# Patient Record
Sex: FEMALE | Race: BLACK OR AFRICAN AMERICAN | NOT HISPANIC OR LATINO | Employment: OTHER | URBAN - METROPOLITAN AREA
[De-identification: names, ages, dates, MRNs, and addresses within clinical notes are randomized per-mention and may not be internally consistent; named-entity substitution may affect disease eponyms.]

---

## 2024-05-21 ENCOUNTER — APPOINTMENT (OUTPATIENT)
Dept: RADIOLOGY | Facility: HOSPITAL | Age: 84
DRG: 312 | End: 2024-05-21
Payer: COMMERCIAL

## 2024-05-21 ENCOUNTER — CLINICAL SUPPORT (OUTPATIENT)
Dept: EMERGENCY MEDICINE | Facility: HOSPITAL | Age: 84
DRG: 312 | End: 2024-05-21
Payer: COMMERCIAL

## 2024-05-21 ENCOUNTER — HOSPITAL ENCOUNTER (INPATIENT)
Facility: HOSPITAL | Age: 84
LOS: 7 days | Discharge: HOME | DRG: 312 | End: 2024-05-28
Attending: EMERGENCY MEDICINE | Admitting: INTERNAL MEDICINE
Payer: COMMERCIAL

## 2024-05-21 DIAGNOSIS — I10 PRIMARY HYPERTENSION: ICD-10-CM
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
## 2024-05-21 DIAGNOSIS — K59.01 SLOW TRANSIT CONSTIPATION: ICD-10-CM

## 2024-05-21 DIAGNOSIS — R55 SYNCOPE, UNSPECIFIED SYNCOPE TYPE: ICD-10-CM

## 2024-05-21 LAB
ALBUMIN SERPL BCP-MCNC: 3.1 G/DL (ref 3.4–5)
ALBUMIN SERPL BCP-MCNC: 4.2 G/DL (ref 3.4–5)
ALP SERPL-CCNC: 54 U/L (ref 33–136)
ALT SERPL W P-5'-P-CCNC: <3 U/L (ref 7–45)
ANION GAP SERPL CALC-SCNC: 14 MMOL/L (ref 10–20)
ANION GAP SERPL CALC-SCNC: 19 MMOL/L (ref 10–20)
APPEARANCE UR: CLEAR
AST SERPL W P-5'-P-CCNC: 13 U/L (ref 9–39)
BACTERIA #/AREA URNS AUTO: ABNORMAL /HPF
BASOPHILS # BLD AUTO: 0.03 X10*3/UL (ref 0–0.1)
BASOPHILS # BLD AUTO: 0.04 X10*3/UL (ref 0–0.1)
BASOPHILS NFR BLD AUTO: 0.3 %
BASOPHILS NFR BLD AUTO: 0.5 %
BILIRUB SERPL-MCNC: 0.5 MG/DL (ref 0–1.2)
BILIRUB UR STRIP.AUTO-MCNC: NEGATIVE MG/DL
BUN SERPL-MCNC: 46 MG/DL (ref 6–23)
BUN SERPL-MCNC: 52 MG/DL (ref 6–23)
CALCIUM SERPL-MCNC: 7.6 MG/DL (ref 8.6–10.6)
CALCIUM SERPL-MCNC: 9.4 MG/DL (ref 8.6–10.6)
CARDIAC TROPONIN I PNL SERPL HS: 16 NG/L (ref 0–34)
CARDIAC TROPONIN I PNL SERPL HS: 16 NG/L (ref 0–34)
CHLORIDE SERPL-SCNC: 103 MMOL/L (ref 98–107)
CHLORIDE SERPL-SCNC: 98 MMOL/L (ref 98–107)
CHLORIDE UR-SCNC: 61 MMOL/L
CHLORIDE/CREATININE (MMOL/G) IN URINE: 79 MMOL/G CREAT (ref 38–318)
CO2 SERPL-SCNC: 25 MMOL/L (ref 21–32)
CO2 SERPL-SCNC: 27 MMOL/L (ref 21–32)
COLOR UR: ABNORMAL
CREAT SERPL-MCNC: 1.89 MG/DL (ref 0.5–1.05)
CREAT SERPL-MCNC: 2.32 MG/DL (ref 0.5–1.05)
CREAT UR-MCNC: 76.8 MG/DL (ref 20–320)
EGFRCR SERPLBLD CKD-EPI 2021: 20 ML/MIN/1.73M*2
EGFRCR SERPLBLD CKD-EPI 2021: 26 ML/MIN/1.73M*2
EOSINOPHIL # BLD AUTO: 1.12 X10*3/UL (ref 0–0.4)
EOSINOPHIL # BLD AUTO: 1.35 X10*3/UL (ref 0–0.4)
EOSINOPHIL NFR BLD AUTO: 11.5 %
EOSINOPHIL NFR BLD AUTO: 15.6 %
ERYTHROCYTE [DISTWIDTH] IN BLOOD BY AUTOMATED COUNT: 14.7 % (ref 11.5–14.5)
ERYTHROCYTE [DISTWIDTH] IN BLOOD BY AUTOMATED COUNT: 15 % (ref 11.5–14.5)
GLUCOSE BLD MANUAL STRIP-MCNC: 164 MG/DL (ref 74–99)
GLUCOSE BLD MANUAL STRIP-MCNC: 167 MG/DL (ref 74–99)
GLUCOSE BLD MANUAL STRIP-MCNC: 192 MG/DL (ref 74–99)
GLUCOSE BLD MANUAL STRIP-MCNC: 239 MG/DL (ref 74–99)
GLUCOSE SERPL-MCNC: 169 MG/DL (ref 74–99)
GLUCOSE SERPL-MCNC: 188 MG/DL (ref 74–99)
GLUCOSE UR STRIP.AUTO-MCNC: ABNORMAL MG/DL
HCT VFR BLD AUTO: 32.5 % (ref 36–46)
HCT VFR BLD AUTO: 39.2 % (ref 36–46)
HGB BLD-MCNC: 11.2 G/DL (ref 12–16)
HGB BLD-MCNC: 13.4 G/DL (ref 12–16)
HOLD SPECIMEN: NORMAL
IMM GRANULOCYTES # BLD AUTO: 0.03 X10*3/UL (ref 0–0.5)
IMM GRANULOCYTES # BLD AUTO: 0.04 X10*3/UL (ref 0–0.5)
IMM GRANULOCYTES NFR BLD AUTO: 0.3 % (ref 0–0.9)
IMM GRANULOCYTES NFR BLD AUTO: 0.5 % (ref 0–0.9)
KETONES UR STRIP.AUTO-MCNC: NEGATIVE MG/DL
LEUKOCYTE ESTERASE UR QL STRIP.AUTO: ABNORMAL
LYMPHOCYTES # BLD AUTO: 2.01 X10*3/UL (ref 0.8–3)
LYMPHOCYTES # BLD AUTO: 2.41 X10*3/UL (ref 0.8–3)
LYMPHOCYTES NFR BLD AUTO: 20.7 %
LYMPHOCYTES NFR BLD AUTO: 27.9 %
MAGNESIUM SERPL-MCNC: 1.72 MG/DL (ref 1.6–2.4)
MAGNESIUM SERPL-MCNC: 2.17 MG/DL (ref 1.6–2.4)
MCH RBC QN AUTO: 31.3 PG (ref 26–34)
MCH RBC QN AUTO: 31.6 PG (ref 26–34)
MCHC RBC AUTO-ENTMCNC: 34.2 G/DL (ref 32–36)
MCHC RBC AUTO-ENTMCNC: 34.5 G/DL (ref 32–36)
MCV RBC AUTO: 92 FL (ref 80–100)
MCV RBC AUTO: 92 FL (ref 80–100)
MONOCYTES # BLD AUTO: 0.48 X10*3/UL (ref 0.05–0.8)
MONOCYTES # BLD AUTO: 0.58 X10*3/UL (ref 0.05–0.8)
MONOCYTES NFR BLD AUTO: 5.5 %
MONOCYTES NFR BLD AUTO: 6 %
NEUTROPHILS # BLD AUTO: 4.33 X10*3/UL (ref 1.6–5.5)
NEUTROPHILS # BLD AUTO: 5.94 X10*3/UL (ref 1.6–5.5)
NEUTROPHILS NFR BLD AUTO: 50 %
NEUTROPHILS NFR BLD AUTO: 61.2 %
NITRITE UR QL STRIP.AUTO: NEGATIVE
NRBC BLD-RTO: 0 /100 WBCS (ref 0–0)
NRBC BLD-RTO: 0 /100 WBCS (ref 0–0)
PH UR STRIP.AUTO: 6.5 [PH]
PHOSPHATE SERPL-MCNC: 3.7 MG/DL (ref 2.5–4.9)
PLATELET # BLD AUTO: 218 X10*3/UL (ref 150–450)
PLATELET # BLD AUTO: 260 X10*3/UL (ref 150–450)
POTASSIUM SERPL-SCNC: 3.4 MMOL/L (ref 3.5–5.3)
POTASSIUM SERPL-SCNC: 4.3 MMOL/L (ref 3.5–5.3)
POTASSIUM UR-SCNC: 33 MMOL/L
POTASSIUM/CREAT UR-RTO: 43 MMOL/G CREAT
PROT SERPL-MCNC: 7.7 G/DL (ref 6.4–8.2)
PROT UR STRIP.AUTO-MCNC: NEGATIVE MG/DL
RBC # BLD AUTO: 3.54 X10*6/UL (ref 4–5.2)
RBC # BLD AUTO: 4.28 X10*6/UL (ref 4–5.2)
RBC # UR STRIP.AUTO: NEGATIVE /UL
RBC #/AREA URNS AUTO: ABNORMAL /HPF
SARS-COV-2 RNA RESP QL NAA+PROBE: NOT DETECTED
SODIUM SERPL-SCNC: 139 MMOL/L (ref 136–145)
SODIUM SERPL-SCNC: 140 MMOL/L (ref 136–145)
SODIUM UR-SCNC: 69 MMOL/L
SODIUM/CREAT UR-RTO: 90 MMOL/G CREAT
SP GR UR STRIP.AUTO: 1.01
SQUAMOUS #/AREA URNS AUTO: ABNORMAL /HPF
T4 FREE SERPL-MCNC: 1.77 NG/DL (ref 0.78–1.48)
TSH SERPL-ACNC: 11.06 MIU/L (ref 0.44–3.98)
UROBILINOGEN UR STRIP.AUTO-MCNC: NORMAL MG/DL
WBC # BLD AUTO: 8.7 X10*3/UL (ref 4.4–11.3)
WBC # BLD AUTO: 9.7 X10*3/UL (ref 4.4–11.3)
WBC #/AREA URNS AUTO: ABNORMAL /HPF

## 2024-05-21 PROCEDURE — 70450 CT HEAD/BRAIN W/O DYE: CPT | Performed by: RADIOLOGY

## 2024-05-21 PROCEDURE — 72125 CT NECK SPINE W/O DYE: CPT | Performed by: RADIOLOGY

## 2024-05-21 PROCEDURE — 81003 URINALYSIS AUTO W/O SCOPE: CPT | Performed by: STUDENT IN AN ORGANIZED HEALTH CARE EDUCATION/TRAINING PROGRAM

## 2024-05-21 PROCEDURE — 83735 ASSAY OF MAGNESIUM: CPT | Mod: 91

## 2024-05-21 PROCEDURE — 73030 X-RAY EXAM OF SHOULDER: CPT | Mod: RT

## 2024-05-21 PROCEDURE — 84484 ASSAY OF TROPONIN QUANT: CPT | Performed by: STUDENT IN AN ORGANIZED HEALTH CARE EDUCATION/TRAINING PROGRAM

## 2024-05-21 PROCEDURE — 2500000002 HC RX 250 W HCPCS SELF ADMINISTERED DRUGS (ALT 637 FOR MEDICARE OP, ALT 636 FOR OP/ED)

## 2024-05-21 PROCEDURE — 2500000004 HC RX 250 GENERAL PHARMACY W/ HCPCS (ALT 636 FOR OP/ED): Performed by: STUDENT IN AN ORGANIZED HEALTH CARE EDUCATION/TRAINING PROGRAM

## 2024-05-21 PROCEDURE — 84439 ASSAY OF FREE THYROXINE: CPT

## 2024-05-21 PROCEDURE — 70450 CT HEAD/BRAIN W/O DYE: CPT

## 2024-05-21 PROCEDURE — 36415 COLL VENOUS BLD VENIPUNCTURE: CPT | Performed by: STUDENT IN AN ORGANIZED HEALTH CARE EDUCATION/TRAINING PROGRAM

## 2024-05-21 PROCEDURE — 73060 X-RAY EXAM OF HUMERUS: CPT | Mod: RT

## 2024-05-21 PROCEDURE — 87635 SARS-COV-2 COVID-19 AMP PRB: CPT | Performed by: STUDENT IN AN ORGANIZED HEALTH CARE EDUCATION/TRAINING PROGRAM

## 2024-05-21 PROCEDURE — 82947 ASSAY GLUCOSE BLOOD QUANT: CPT | Mod: 91

## 2024-05-21 PROCEDURE — 84133 ASSAY OF URINE POTASSIUM: CPT | Mod: 91

## 2024-05-21 PROCEDURE — 93010 ELECTROCARDIOGRAM REPORT: CPT | Performed by: EMERGENCY MEDICINE

## 2024-05-21 PROCEDURE — 2500000001 HC RX 250 WO HCPCS SELF ADMINISTERED DRUGS (ALT 637 FOR MEDICARE OP): Performed by: INTERNAL MEDICINE

## 2024-05-21 PROCEDURE — 84443 ASSAY THYROID STIM HORMONE: CPT

## 2024-05-21 PROCEDURE — 1200000002 HC GENERAL ROOM WITH TELEMETRY DAILY

## 2024-05-21 PROCEDURE — 99285 EMERGENCY DEPT VISIT HI MDM: CPT | Performed by: EMERGENCY MEDICINE

## 2024-05-21 PROCEDURE — 2500000001 HC RX 250 WO HCPCS SELF ADMINISTERED DRUGS (ALT 637 FOR MEDICARE OP)

## 2024-05-21 PROCEDURE — 80069 RENAL FUNCTION PANEL: CPT | Mod: CCI

## 2024-05-21 PROCEDURE — 2500000004 HC RX 250 GENERAL PHARMACY W/ HCPCS (ALT 636 FOR OP/ED)

## 2024-05-21 PROCEDURE — 36415 COLL VENOUS BLD VENIPUNCTURE: CPT

## 2024-05-21 PROCEDURE — 82947 ASSAY GLUCOSE BLOOD QUANT: CPT

## 2024-05-21 PROCEDURE — 99285 EMERGENCY DEPT VISIT HI MDM: CPT | Mod: 25

## 2024-05-21 PROCEDURE — 85025 COMPLETE CBC W/AUTO DIFF WBC: CPT | Performed by: STUDENT IN AN ORGANIZED HEALTH CARE EDUCATION/TRAINING PROGRAM

## 2024-05-21 PROCEDURE — 85025 COMPLETE CBC W/AUTO DIFF WBC: CPT | Mod: 91

## 2024-05-21 PROCEDURE — 93005 ELECTROCARDIOGRAM TRACING: CPT

## 2024-05-21 PROCEDURE — 72125 CT NECK SPINE W/O DYE: CPT

## 2024-05-21 PROCEDURE — 73060 X-RAY EXAM OF HUMERUS: CPT | Mod: RIGHT SIDE | Performed by: RADIOLOGY

## 2024-05-21 PROCEDURE — 96360 HYDRATION IV INFUSION INIT: CPT

## 2024-05-21 PROCEDURE — 73590 X-RAY EXAM OF LOWER LEG: CPT | Mod: 50

## 2024-05-21 PROCEDURE — 83735 ASSAY OF MAGNESIUM: CPT | Performed by: STUDENT IN AN ORGANIZED HEALTH CARE EDUCATION/TRAINING PROGRAM

## 2024-05-21 PROCEDURE — 87086 URINE CULTURE/COLONY COUNT: CPT | Mod: 91 | Performed by: STUDENT IN AN ORGANIZED HEALTH CARE EDUCATION/TRAINING PROGRAM

## 2024-05-21 PROCEDURE — 73590 X-RAY EXAM OF LOWER LEG: CPT | Mod: BILATERAL PROCEDURE | Performed by: RADIOLOGY

## 2024-05-21 PROCEDURE — 82436 ASSAY OF URINE CHLORIDE: CPT

## 2024-05-21 PROCEDURE — 80053 COMPREHEN METABOLIC PANEL: CPT | Performed by: STUDENT IN AN ORGANIZED HEALTH CARE EDUCATION/TRAINING PROGRAM

## 2024-05-21 PROCEDURE — 73030 X-RAY EXAM OF SHOULDER: CPT | Mod: RIGHT SIDE | Performed by: RADIOLOGY

## 2024-05-21 RX ORDER — SPIRONOLACTONE 25 MG/1
12.5 TABLET ORAL DAILY
Status: DISCONTINUED | OUTPATIENT
Start: 2024-05-21 | End: 2024-05-28 | Stop reason: HOSPADM

## 2024-05-21 RX ORDER — CLOPIDOGREL BISULFATE 75 MG/1
75 TABLET ORAL DAILY
Status: DISCONTINUED | OUTPATIENT
Start: 2024-05-21 | End: 2024-05-28 | Stop reason: HOSPADM

## 2024-05-21 RX ORDER — HEPARIN SODIUM 5000 [USP'U]/ML
5000 INJECTION, SOLUTION INTRAVENOUS; SUBCUTANEOUS EVERY 8 HOURS
Status: DISCONTINUED | OUTPATIENT
Start: 2024-05-21 | End: 2024-05-28 | Stop reason: HOSPADM

## 2024-05-21 RX ORDER — CARVEDILOL 12.5 MG/1
25 TABLET ORAL
Status: DISCONTINUED | OUTPATIENT
Start: 2024-05-21 | End: 2024-05-25

## 2024-05-21 RX ORDER — ALLOPURINOL 100 MG/1
50 TABLET ORAL
Status: DISCONTINUED | OUTPATIENT
Start: 2024-05-21 | End: 2024-05-28 | Stop reason: HOSPADM

## 2024-05-21 RX ORDER — ALLOPURINOL 100 MG/1
50 TABLET ORAL EVERY OTHER DAY
COMMUNITY

## 2024-05-21 RX ORDER — ALLOPURINOL 100 MG/1
50 TABLET ORAL DAILY
Status: DISCONTINUED | OUTPATIENT
Start: 2024-05-21 | End: 2024-05-21

## 2024-05-21 RX ORDER — LEVOTHYROXINE SODIUM 50 UG/1
150 TABLET ORAL DAILY
Status: DISCONTINUED | OUTPATIENT
Start: 2024-05-21 | End: 2024-05-28 | Stop reason: HOSPADM

## 2024-05-21 RX ORDER — INSULIN LISPRO 100 [IU]/ML
0-5 INJECTION, SOLUTION INTRAVENOUS; SUBCUTANEOUS
Status: DISCONTINUED | OUTPATIENT
Start: 2024-05-21 | End: 2024-05-28 | Stop reason: HOSPADM

## 2024-05-21 RX ORDER — LEVOTHYROXINE SODIUM 150 UG/1
150 TABLET ORAL DAILY
COMMUNITY

## 2024-05-21 RX ORDER — DEXTROSE 50 % IN WATER (D50W) INTRAVENOUS SYRINGE
12.5
Status: DISCONTINUED | OUTPATIENT
Start: 2024-05-21 | End: 2024-05-28 | Stop reason: HOSPADM

## 2024-05-21 RX ORDER — ACETAMINOPHEN 500 MG
500 TABLET ORAL EVERY 6 HOURS PRN
COMMUNITY

## 2024-05-21 RX ORDER — ALLOPURINOL 100 MG/1
100 TABLET ORAL DAILY
Status: DISCONTINUED | OUTPATIENT
Start: 2024-05-21 | End: 2024-05-21

## 2024-05-21 RX ORDER — HYDRALAZINE HYDROCHLORIDE 50 MG/1
50 TABLET, FILM COATED ORAL 2 TIMES DAILY
Status: DISCONTINUED | OUTPATIENT
Start: 2024-05-21 | End: 2024-05-28 | Stop reason: HOSPADM

## 2024-05-21 RX ORDER — FUROSEMIDE 40 MG/1
40 TABLET ORAL DAILY
Status: DISCONTINUED | OUTPATIENT
Start: 2024-05-21 | End: 2024-05-28 | Stop reason: HOSPADM

## 2024-05-21 RX ORDER — FUROSEMIDE 40 MG/1
40 TABLET ORAL DAILY
Status: ON HOLD | COMMUNITY
End: 2024-05-28

## 2024-05-21 RX ORDER — HYDRALAZINE HYDROCHLORIDE 50 MG/1
50 TABLET, FILM COATED ORAL 2 TIMES DAILY
COMMUNITY
End: 2024-05-28 | Stop reason: HOSPADM

## 2024-05-21 RX ORDER — ONDANSETRON HYDROCHLORIDE 2 MG/ML
4 INJECTION, SOLUTION INTRAVENOUS ONCE
Status: DISCONTINUED | OUTPATIENT
Start: 2024-05-21 | End: 2024-05-22

## 2024-05-21 RX ORDER — OXYCODONE HYDROCHLORIDE 5 MG/1
5 TABLET ORAL EVERY 6 HOURS PRN
Status: DISCONTINUED | OUTPATIENT
Start: 2024-05-21 | End: 2024-05-22

## 2024-05-21 RX ORDER — SACUBITRIL AND VALSARTAN 49; 51 MG/1; MG/1
1 TABLET, FILM COATED ORAL 2 TIMES DAILY
COMMUNITY
End: 2024-05-28 | Stop reason: HOSPADM

## 2024-05-21 RX ORDER — ACETAMINOPHEN 325 MG/1
975 TABLET ORAL EVERY 8 HOURS
Status: DISCONTINUED | OUTPATIENT
Start: 2024-05-21 | End: 2024-05-28 | Stop reason: HOSPADM

## 2024-05-21 RX ORDER — DEXTROSE 50 % IN WATER (D50W) INTRAVENOUS SYRINGE
25
Status: DISCONTINUED | OUTPATIENT
Start: 2024-05-21 | End: 2024-05-28 | Stop reason: HOSPADM

## 2024-05-21 RX ORDER — CARVEDILOL 25 MG/1
25 TABLET ORAL
COMMUNITY
End: 2024-05-28 | Stop reason: HOSPADM

## 2024-05-21 RX ORDER — POLYETHYLENE GLYCOL 3350 17 G/17G
17 POWDER, FOR SOLUTION ORAL DAILY
Status: DISCONTINUED | OUTPATIENT
Start: 2024-05-21 | End: 2024-05-28 | Stop reason: HOSPADM

## 2024-05-21 RX ORDER — ATORVASTATIN CALCIUM 20 MG/1
20 TABLET, FILM COATED ORAL DAILY
COMMUNITY

## 2024-05-21 RX ORDER — SPIRONOLACTONE 25 MG/1
12.5 TABLET ORAL DAILY
COMMUNITY
End: 2024-05-28 | Stop reason: HOSPADM

## 2024-05-21 RX ORDER — ACETAMINOPHEN 325 MG/1
975 TABLET ORAL ONCE
Status: COMPLETED | OUTPATIENT
Start: 2024-05-21 | End: 2024-05-21

## 2024-05-21 RX ORDER — MORPHINE SULFATE 4 MG/ML
2 INJECTION INTRAVENOUS ONCE
Status: COMPLETED | OUTPATIENT
Start: 2024-05-21 | End: 2024-05-21

## 2024-05-21 RX ORDER — ATORVASTATIN CALCIUM 20 MG/1
20 TABLET, FILM COATED ORAL DAILY
Status: DISCONTINUED | OUTPATIENT
Start: 2024-05-21 | End: 2024-05-28 | Stop reason: HOSPADM

## 2024-05-21 RX ORDER — CLOPIDOGREL BISULFATE 75 MG/1
75 TABLET ORAL DAILY
COMMUNITY

## 2024-05-21 RX ADMIN — CARVEDILOL 25 MG: 12.5 TABLET, FILM COATED ORAL at 17:46

## 2024-05-21 RX ADMIN — SACUBITRIL AND VALSARTAN 1 TABLET: 49; 51 TABLET, FILM COATED ORAL at 21:53

## 2024-05-21 RX ADMIN — SODIUM CHLORIDE, POTASSIUM CHLORIDE, SODIUM LACTATE AND CALCIUM CHLORIDE 1000 ML: 600; 310; 30; 20 INJECTION, SOLUTION INTRAVENOUS at 02:51

## 2024-05-21 RX ADMIN — ATORVASTATIN CALCIUM 20 MG: 20 TABLET, FILM COATED ORAL at 08:43

## 2024-05-21 RX ADMIN — HYDRALAZINE HYDROCHLORIDE 50 MG: 25 TABLET ORAL at 21:53

## 2024-05-21 RX ADMIN — INSULIN LISPRO 1 UNITS: 100 INJECTION, SOLUTION INTRAVENOUS; SUBCUTANEOUS at 17:45

## 2024-05-21 RX ADMIN — CARVEDILOL 25 MG: 12.5 TABLET, FILM COATED ORAL at 07:44

## 2024-05-21 RX ADMIN — HEPARIN SODIUM 5000 UNITS: 5000 INJECTION INTRAVENOUS; SUBCUTANEOUS at 21:52

## 2024-05-21 RX ADMIN — ACETAMINOPHEN 975 MG: 325 TABLET ORAL at 17:45

## 2024-05-21 RX ADMIN — INSULIN LISPRO 1 UNITS: 100 INJECTION, SOLUTION INTRAVENOUS; SUBCUTANEOUS at 11:07

## 2024-05-21 RX ADMIN — ACETAMINOPHEN 975 MG: 325 TABLET ORAL at 01:55

## 2024-05-21 RX ADMIN — SACUBITRIL AND VALSARTAN 1 TABLET: 49; 51 TABLET, FILM COATED ORAL at 08:42

## 2024-05-21 RX ADMIN — LEVOTHYROXINE SODIUM 150 MCG: 0.07 TABLET ORAL at 07:44

## 2024-05-21 RX ADMIN — CLOPIDOGREL BISULFATE 75 MG: 75 TABLET ORAL at 08:42

## 2024-05-21 RX ADMIN — HYDRALAZINE HYDROCHLORIDE 50 MG: 25 TABLET ORAL at 08:42

## 2024-05-21 RX ADMIN — MORPHINE SULFATE 2 MG: 4 INJECTION INTRAVENOUS at 03:51

## 2024-05-21 SDOH — SOCIAL STABILITY: SOCIAL INSECURITY: ABUSE: ADULT

## 2024-05-21 SDOH — SOCIAL STABILITY: SOCIAL INSECURITY: HAVE YOU HAD ANY THOUGHTS OF HARMING ANYONE ELSE?: NO

## 2024-05-21 SDOH — SOCIAL STABILITY: SOCIAL INSECURITY: DOES ANYONE TRY TO KEEP YOU FROM HAVING/CONTACTING OTHER FRIENDS OR DOING THINGS OUTSIDE YOUR HOME?: NO

## 2024-05-21 SDOH — SOCIAL STABILITY: SOCIAL INSECURITY: HAVE YOU HAD THOUGHTS OF HARMING ANYONE ELSE?: NO

## 2024-05-21 SDOH — SOCIAL STABILITY: SOCIAL INSECURITY: DO YOU FEEL UNSAFE GOING BACK TO THE PLACE WHERE YOU ARE LIVING?: NO

## 2024-05-21 SDOH — SOCIAL STABILITY: SOCIAL INSECURITY: HAS ANYONE EVER THREATENED TO HURT YOUR FAMILY OR YOUR PETS?: NO

## 2024-05-21 SDOH — SOCIAL STABILITY: SOCIAL INSECURITY: WERE YOU ABLE TO COMPLETE ALL THE BEHAVIORAL HEALTH SCREENINGS?: YES

## 2024-05-21 SDOH — SOCIAL STABILITY: SOCIAL INSECURITY: ARE THERE ANY APPARENT SIGNS OF INJURIES/BEHAVIORS THAT COULD BE RELATED TO ABUSE/NEGLECT?: NO

## 2024-05-21 SDOH — SOCIAL STABILITY: SOCIAL INSECURITY: ARE YOU OR HAVE YOU BEEN THREATENED OR ABUSED PHYSICALLY, EMOTIONALLY, OR SEXUALLY BY ANYONE?: NO

## 2024-05-21 SDOH — SOCIAL STABILITY: SOCIAL INSECURITY: DO YOU FEEL ANYONE HAS EXPLOITED OR TAKEN ADVANTAGE OF YOU FINANCIALLY OR OF YOUR PERSONAL PROPERTY?: NO

## 2024-05-21 ASSESSMENT — ACTIVITIES OF DAILY LIVING (ADL)
BATHING: NEEDS ASSISTANCE
HEARING - RIGHT EAR: FUNCTIONAL
ASSISTIVE_DEVICE: CANE;WHEELCHAIR;WALKER
FEEDING YOURSELF: INDEPENDENT
HEARING - LEFT EAR: FUNCTIONAL
GROOMING: NEEDS ASSISTANCE
PATIENT'S MEMORY ADEQUATE TO SAFELY COMPLETE DAILY ACTIVITIES?: YES
ADEQUATE_TO_COMPLETE_ADL: YES
DRESSING YOURSELF: NEEDS ASSISTANCE
TOILETING: NEEDS ASSISTANCE
WALKS IN HOME: NEEDS ASSISTANCE
JUDGMENT_ADEQUATE_SAFELY_COMPLETE_DAILY_ACTIVITIES: YES

## 2024-05-21 ASSESSMENT — LIFESTYLE VARIABLES
HOW OFTEN DO YOU HAVE 6 OR MORE DRINKS ON ONE OCCASION: NEVER
HOW MANY STANDARD DRINKS CONTAINING ALCOHOL DO YOU HAVE ON A TYPICAL DAY: PATIENT DOES NOT DRINK
PRESCIPTION_ABUSE_PAST_12_MONTHS: NO
AUDIT-C TOTAL SCORE: 0
HAVE YOU EVER FELT YOU SHOULD CUT DOWN ON YOUR DRINKING: NO
EVER FELT BAD OR GUILTY ABOUT YOUR DRINKING: NO
EVER HAD A DRINK FIRST THING IN THE MORNING TO STEADY YOUR NERVES TO GET RID OF A HANGOVER: NO
HAVE PEOPLE ANNOYED YOU BY CRITICIZING YOUR DRINKING: NO
TOTAL SCORE: 0
SUBSTANCE_ABUSE_PAST_12_MONTHS: NO
AUDIT-C TOTAL SCORE: 0
SKIP TO QUESTIONS 9-10: 1
HOW OFTEN DO YOU HAVE A DRINK CONTAINING ALCOHOL: NEVER

## 2024-05-21 ASSESSMENT — COGNITIVE AND FUNCTIONAL STATUS - GENERAL
HELP NEEDED FOR BATHING: A LOT
TOILETING: A LITTLE
PERSONAL GROOMING: A LITTLE
STANDING UP FROM CHAIR USING ARMS: A LOT
MOBILITY SCORE: 13
TURNING FROM BACK TO SIDE WHILE IN FLAT BAD: A LITTLE
CLIMB 3 TO 5 STEPS WITH RAILING: TOTAL
WALKING IN HOSPITAL ROOM: A LOT
DRESSING REGULAR UPPER BODY CLOTHING: A LOT
DAILY ACTIVITIY SCORE: 16
MOVING TO AND FROM BED TO CHAIR: A LOT
DRESSING REGULAR LOWER BODY CLOTHING: A LOT
MOVING FROM LYING ON BACK TO SITTING ON SIDE OF FLAT BED WITH BEDRAILS: A LITTLE

## 2024-05-21 ASSESSMENT — ENCOUNTER SYMPTOMS
SORE THROAT: 0
DIARRHEA: 0
DIZZINESS: 1
COUGH: 0
FREQUENCY: 0
PALPITATIONS: 0
VOMITING: 0
CHILLS: 0
SHORTNESS OF BREATH: 1
FEVER: 0
DYSURIA: 0
ABDOMINAL PAIN: 0
RHINORRHEA: 0
DIFFICULTY URINATING: 0
NAUSEA: 1
FATIGUE: 1
CONSTIPATION: 1
LIGHT-HEADEDNESS: 1

## 2024-05-21 ASSESSMENT — COLUMBIA-SUICIDE SEVERITY RATING SCALE - C-SSRS
1. IN THE PAST MONTH, HAVE YOU WISHED YOU WERE DEAD OR WISHED YOU COULD GO TO SLEEP AND NOT WAKE UP?: NO
6. HAVE YOU EVER DONE ANYTHING, STARTED TO DO ANYTHING, OR PREPARED TO DO ANYTHING TO END YOUR LIFE?: NO
2. HAVE YOU ACTUALLY HAD ANY THOUGHTS OF KILLING YOURSELF?: NO

## 2024-05-21 ASSESSMENT — PAIN DESCRIPTION - LOCATION
LOCATION: HEAD

## 2024-05-21 ASSESSMENT — PAIN - FUNCTIONAL ASSESSMENT
PAIN_FUNCTIONAL_ASSESSMENT: 0-10

## 2024-05-21 ASSESSMENT — PATIENT HEALTH QUESTIONNAIRE - PHQ9
1. LITTLE INTEREST OR PLEASURE IN DOING THINGS: NOT AT ALL
SUM OF ALL RESPONSES TO PHQ9 QUESTIONS 1 & 2: 0
2. FEELING DOWN, DEPRESSED OR HOPELESS: NOT AT ALL

## 2024-05-21 ASSESSMENT — PAIN SCALES - GENERAL
PAINLEVEL_OUTOF10: 4
PAINLEVEL_OUTOF10: 10 - WORST POSSIBLE PAIN
PAINLEVEL_OUTOF10: 0 - NO PAIN
PAINLEVEL_OUTOF10: 6
PAINLEVEL_OUTOF10: 5 - MODERATE PAIN

## 2024-05-21 NOTE — H&P
History Of Present Illness  Kristy Sanchez is a 83 y.o. female with PMH including HFrEF (45%), JENELLE (on CPAP), CKD 4, HTN, HLD, T2DM, Hypothyroidism, gout, Hx CVA (2017 on clopidogrel), Hx breast cancer s/p surgery and chemo (2004, reportedly in remission) who presented to [facility] ED on 5/21 for syncope and fall. She is currently being admitted to medicine for further management.    Of note, she was recently seen at [facility] ED 5/16 for shortness of breath, she was in CDU and underwent echocardiogram which per report had “no significant changes since the prior study. (EF 45%, small pericardial effusions)”. Her medications were adjusted with carvedilol increased to 25 mg BID, switched from Lisinopril to entresto 49-51 after washout period, started on spironolactone 12.5, and increased Lasix to 40 mg daily.     Per the patient, she states she “fell out” while using the bathroom and fell and hit her head. For the past few days she notes that she has been getting dizzy when she stands up too fast, which is new for her and started happening around Sunday. This time she was getting up after using the bathroom, felt sick to her stomach, felt really dizzy then fell and hit her head. She denies chest pain during this, no tongue or cheek biting. She reports that she has been adherent to her medications and has been taking them as directed after her [facility] hospitalization. She states that her shortness of breath is about at it's baseline, and endorses stable 2 pillow orthopnea. She denies PND. She otherwise denies f/c, cough, abdominal pain, or trouble urinating. She endorses constipation and says her last BM was today during this episode.    In the ED:  - Vitals:   T 36.5, HR 75, /81, RR 18, SpO2 100 on RA  - Labs:   CBC: WBC 8.7, Hgb 13.4, plt 260   BMP: Na 140, K 4.3, Cl 98, HCO3 27, BUN 52, Cr 2.32, glu 188   LFT: Ca 9.4, tprot 7.7, alb 4.2, alkphos 54, AST 13, ALT <3, tbili 0.5   Electrolytes: Mg 2.17   hs-TnI 16 -> 16  -  Imaging:  XR Tibia fibula bilateral 2 views  Impression:    No acute fracture. Generalized diffuse osteopenia.      Mild soft tissue swelling noted.    XR shoulder right 2+ views  Impression:    No acute osseous abnormality of the right shoulder or humerus.      Moderate right shoulder osteoarthrosis.    XR humerus right  Impression:    No acute osseous abnormality of the right shoulder or humerus.      Moderate right shoulder osteoarthrosis.    CT Head w/o  CT Cervical spine w/o  Impression:    1. No acute intracranial abnormality or calvarial fracture.  2. Scalp swelling about the left paramidline vertex and right  frontotemporal scalp.  3. Chronic changes as noted above.  4. No acute fracture or traumatic subluxation of the cervical spine.  5. Multilevel degenerative changes of the cervical spine as described  above.      In the ED, workup included as above. Interventions included acetaminophen 975 mg once, LR 1L, morphine 2 mg once, ondansetron 4mg once    Cardiac Hx:  Echo 5/15/2024 at [facility], results reviewed in EMR:  Findings/Conclusions      Chambers      LV                  Left ventricular systolic function is moderately                        globally reduced.                        The left ventricular ejection fraction (LVEF) is 45%                        +/- 5%by the biplane summation of discs (Gonzalez's                        rule) method.      RV                  Normal right ventricular size and function.                        The tricuspid annular plane systolic excursion (TAPSE,                        a marker of RV systolic function) is normal at 19 mm                        (normal >16 mm).      Valves      Pericardium/Pleura  A small pericardial effusion is present.                        The thickness of the pericardial effusion is 0.6 cm                        posteriorly.                        Evidence of a high intra-pericardial pressure                        (tamponade physiology) is  absent.      Hemodynamics        Estimated RA pressure is <5 mmHg.      Miscellaneous       Since the prior study from 4/4/2024 no significant                        changes have occurred.                        Please see the prior report for specific comparisons.      Summary      No significant changes since the prior study.    See above and prior report for further details..     Past Medical History  No past medical history on file.    Surgical History  No past surgical history on file.     Social History  She has no history on file for tobacco use, alcohol use, and drug use.    Family History  No family history on file.     Allergies  Aspirin, Ciprofloxacin, Isosorbide, and Nsaids (non-steroidal anti-inflammatory drug)    Review of Systems   Constitutional:  Positive for fatigue. Negative for chills and fever.   HENT:  Negative for rhinorrhea and sore throat.    Respiratory:  Positive for shortness of breath. Negative for cough.    Cardiovascular:  Negative for chest pain, palpitations and leg swelling.   Gastrointestinal:  Positive for constipation and nausea. Negative for abdominal pain, diarrhea and vomiting.   Genitourinary:  Negative for difficulty urinating, dysuria and frequency.   Neurological:  Positive for dizziness, syncope and light-headedness.        Physical Exam  Constitutional:       General: She is not in acute distress.     Appearance: She is ill-appearing.   HENT:      Mouth/Throat:      Mouth: Mucous membranes are dry.      Pharynx: No oropharyngeal exudate.   Eyes:      General: No scleral icterus.     Extraocular Movements: Extraocular movements intact.      Pupils: Pupils are equal, round, and reactive to light.   Cardiovascular:      Rate and Rhythm: Normal rate and regular rhythm.      Heart sounds: No murmur heard.  Pulmonary:      Effort: Pulmonary effort is normal.      Breath sounds: No wheezing, rhonchi or rales.   Abdominal:      General: Abdomen is flat. There is no distension.  "     Palpations: Abdomen is soft.      Tenderness: There is no abdominal tenderness.   Musculoskeletal:         General: Tenderness (R shoulder) present.      Right lower leg: No edema.      Left lower leg: No edema.   Skin:     General: Skin is warm and dry.   Neurological:      General: No focal deficit present.      Mental Status: She is alert and oriented to person, place, and time.      Comments: Spontaneously moves all 4 extremities          Last Recorded Vitals  Blood pressure 156/84, pulse 73, temperature 36.5 °C (97.7 °F), temperature source Temporal, resp. rate 18, height 1.676 m (5' 6\"), weight 81.6 kg (180 lb), SpO2 95%.    Relevant Results      Scheduled medications  acetaminophen, 975 mg, oral, q8h  allopurinol, 50 mg, oral, q48h  atorvastatin, 20 mg, oral, Daily  carvedilol, 25 mg, oral, BID  clopidogrel, 75 mg, oral, Daily  [Held by provider] empagliflozin, 10 mg, oral, Daily  [Held by provider] furosemide, 40 mg, oral, Daily  heparin (porcine), 5,000 Units, subcutaneous, q8h  hydrALAZINE, 50 mg, oral, BID  insulin lispro, 0-5 Units, subcutaneous, TID  levothyroxine, 150 mcg, oral, Daily  ondansetron, 4 mg, intravenous, Once  polyethylene glycol, 17 g, oral, Daily  sacubitriL-valsartan, 1 tablet, oral, BID  [Held by provider] spironolactone, 12.5 mg, oral, Daily      Continuous medications     PRN medications  PRN medications: dextrose, dextrose, glucagon, glucagon, oxyCODONE       Assessment/Plan   Principal Problem:    Fall, initial encounter    Assessment & Plan  Kristy Sanchez is an 84 y/o F with PMH including HFrEF (45%), JENELLE (on CPAP), CKD 4, HTN, HLD, T2DM, Hypothyroidism, gout, Hx CVA (2017 on clopidogrel), Hx breast cancer s/p surgery and chemo (2004, reportedly in remission) who presented to [facility] ED on 5/21 for syncope and fall. She is currently being admitted to medicine for further management.    #Syncope  -History most c/w orthostasis, potentially iso uptitration of GDMT and " overdiuresis  -Imaging in ED including CT head/C spine without fracture  -s/p 1L LR in ED    Plan:  -orthostatic vitals  -Holding diuretics, downtitrated GDMT as below  -Pain control with acetaminophen 975 Q8 and oxycodone 5 Q6 PRN  -PT/OT    #SHAHAB on CKD IV  -Recent SCr 1.71 (5/16 [facility]), increased to 2.32 in ED  -Recently had Lasix increased to 40 daily and was started on entresto 49-51 BID at [facility], reportedly followed instructions for Lisinopril washout, was also started on spironolactone on discharge.    Plan:  -holding Lasix, will diurese prn  -Holding spironolactone and empagliflozin iso SHAHAB  -Entresto reduced to 24-26 iso SHAHAB    #HTN  #HFrEF  -EF 45% at OSH (5/15)  -Home regimen includes carvedilol 25, entresto 49-51, jardiance 10, hydralazine 50 BID, jr 12.5, Lasix 40, atorvastatin 20  -regimen was recently uptitrated as of 5/16, entresto was started 5/18    Plan:  -Continue carvedilol, hydralazine, atorvastatin  -holding Lasix, will diurese prn  -Holding spironolactone and empagliflozin iso SHAHAB  -Entresto reduced to 24-26  -Can resume GDMT in stages and uptitrate as tolerated    #Hypothyroidism  -On levothyroxine 150 mcg daily, last TSH 14.3 ([facility]/6 [facility])  -Will add on TSH with reflex, given cardiac comorbidities would likely  benefit from TSH <4    #Hx CVA  -On clopidogrel 75 daily  -Continue home clopidogrel    #Gout  -Continue home allopurinol 50 every other day    #T2DM  - last HBA1C 6.7 (10/2023)  -On empagliflozin for cotreatment with HFrEF  -holding empagliflozin  -SSI while inpatient    F: PRN  E: PRn  N: Cardiac  A: PIV    DVT ppx: heparin  GI ppx: not indicated    Code Status: DNR/DNI (confirmed on admission)  Surrogate Medical Decision-maker: Mike (son) [phone]     Pt to be seen by attending.           Carlos Morton MD PhD

## 2024-05-21 NOTE — ED TRIAGE NOTES
PT arrives via EMS from home with c/o a syncopal episode. PT states she was in the bathroom on the toilet and she started to feel dizzy, she started to get up and then passed out, hitting her head on the sink on her way down. PT had +LOC, pt is on Plavix for a past CVA.

## 2024-05-21 NOTE — PROGRESS NOTES
Pharmacy Medication History Review    Kristy Sanchez is a 83 y.o. female admitted for Fall, initial encounter. Pharmacy reviewed the patient's [id number] medications and allergies for accuracy.    The list below reflects the updated PTA list. Comments regarding how patient may be taking medications differently can be found in the Admit Orders Activity  Prior to Admission Medications   Prescriptions Informant Patient Reported? Taking?   acetaminophen (Tylenol) 500 mg tablet Self Yes PRN   Sig: Take 1 tablet (500 mg) by mouth every 6 hours if needed   for mild pain (1 - 3) or fever (temp greater than 38.0 C).   allopurinol (Zyloprim) 100 mg tablet Self Yes Yes   Sig: Take 0.5 tablets (50 mg) by mouth every other day.   atorvastatin (Lipitor) 20 mg tablet Self Yes Yes   Sig: Take 1 tablet (20 mg) by mouth once daily.   carvedilol (Coreg) 25 mg tablet Self Yes Yes   Sig: Take 1 tablet (25 mg) by mouth 2 times daily   (morning and late afternoon).   clopidogrel (Plavix) 75 mg tablet Self Yes Yes   Sig: Take 1 tablet (75 mg) by mouth once daily.   empagliflozin (Jardiance) 10 mg Self Yes Yes   Sig: Take 1 tablet (10 mg) by mouth once daily.   furosemide (Lasix) 40 mg tablet Self Yes Yes   Sig: Take 1 tablet (40 mg) by mouth once daily.   hydrALAZINE (Apresoline) 50 mg tablet Self Yes Yes   Sig: Take 1 tablet (50 mg) by mouth 2 times a day.   levothyroxine (Synthroid, Levoxyl) 150 mcg tablet Self Yes Yes   Sig: Take 1 tablet (150 mcg) by mouth early in the morning.   Take on an empty stomach at the same time each day,  either 30 to 60 minutes prior to breakfast   sacubitriL-valsartan (Entresto) 49-51 mg tablet Self Yes Yes   Sig: Take 1 tablet by mouth 2 times a day.   spironolactone (Aldactone) 25 mg tablet Self Yes Yes   Sig: Take 0.5 tablets (12.5 mg) by mouth once daily.      Facility-Administered Medications: None        The list below reflects the updated allergy list. Please review each documented allergy for  additional clarification and justification.  Allergies  Reviewed by Petar Canada on 5/21/2024        Severity Reactions Comments    Aspirin Not Specified Nausea/vomiting     Ciprofloxacin Not Specified Headache, Nausea/vomiting     Isosorbide Not Specified Headache, Nausea/vomiting     Nsaids (non-steroidal Anti-inflammatory Drug) Not Specified Other Hx of CVA            Patient accepts M2B at discharge. Pharmacy has been updated to [facility].    Sources used to complete the med history include:  Patient interviewed about medications with family at bedside, alert, cooperative, pleasant and knew her medications and doses  [facility] Dispense Report reviewed  Care Everywhere, [facility] Clinical Summary reviewed  5/17/24  Discharge Telephone Encounter reviewed    Below are additional concerns with the patient's PTA list.  None    ---------------------------------  Petar Canada CPhT  Transitions of Care Technician  Medication reconciliation complete  Please reach out via [employer] Secure Chat for questions,   or if no response call [employer] or [facility].  [facility]

## 2024-05-21 NOTE — ED PROVIDER NOTES
ED TRANSITION OF CARE NOTE    pt Name: Kristy Sanchez  MRN: [id number]  Birthdate 1940  Date of evaluation: 5/21/2024  Provider: Lubna Yanez,     This patient was signed out to me by the departing resident.  This note exists as an addendum to the record.  For the full history and physical please see the ED provider note.    In short, this is a 83 y.o. female presenting to the ED for evaluation after a fall, no significant traumatic injuries identified.  Admitted for syncope workup.    Patient was signed out to me pending: Bed availability    LABS:  Labs Reviewed   CBC WITH AUTO DIFFERENTIAL - Abnormal       Result Value    WBC 8.7      nRBC 0.0      RBC 4.28      Hemoglobin 13.4      Hematocrit 39.2      MCV 92      MCH 31.3      MCHC 34.2      RDW 14.7 (*)     Platelets 260      Neutrophils % 50.0      Immature Granulocytes %, Automated 0.5      Lymphocytes % 27.9      Monocytes % 5.5      Eosinophils % 15.6      Basophils % 0.5      Neutrophils Absolute 4.33      Immature Granulocytes Absolute, Automated 0.04      Lymphocytes Absolute 2.41      Monocytes Absolute 0.48      Eosinophils Absolute 1.35 (*)     Basophils Absolute 0.04     COMPREHENSIVE METABOLIC PANEL - Abnormal    Glucose 188 (*)     Sodium 140      Potassium 4.3      Chloride 98      Bicarbonate 27      Anion Gap 19      Urea Nitrogen 52 (*)     Creatinine 2.32 (*)     eGFR 20 (*)     Calcium 9.4      Albumin 4.2      Alkaline Phosphatase 54      Total Protein 7.7      AST 13      Bilirubin, Total 0.5      ALT <3 (*)    URINALYSIS WITH REFLEX CULTURE AND MICROSCOPIC - Abnormal    Color, Urine Light-Yellow      Appearance, Urine Clear      Specific Gravity, Urine 1.010      pH, Urine 6.5      Protein, Urine NEGATIVE      Glucose, Urine 1000 (4+) (*)     Blood, Urine NEGATIVE      Ketones, Urine NEGATIVE      Bilirubin, Urine NEGATIVE      Urobilinogen, Urine Normal      Nitrite, Urine NEGATIVE      Leukocyte Esterase, Urine 250 Shraan/µL (*)     MICROSCOPIC ONLY, URINE - Abnormal    WBC, Urine 11-20 (*)     RBC, Urine 1-2      Squamous Epithelial Cells, Urine 1-9 (SPARSE)      Bacteria, Urine 1+ (*)    TSH WITH REFLEX TO FREE T4 IF ABNORMAL - Abnormal    Thyroid Stimulating Hormone 11.06 (*)     Narrative:     TSH testing is performed using different testing methodology at [facility] than at [facility]. Direct result comparisons should only be made within the same method.     THYROXINE, FREE - Abnormal    Thyroxine, Free 1.77 (*)     Narrative:     Thyroxine Free testing is performed using different testing methodology at [facility] than at other [facility]. Direct result comparisons should only be made within the same method.   POCT GLUCOSE - Abnormal    POCT Glucose 164 (*)    POCT GLUCOSE - Abnormal    POCT Glucose 167 (*)    MAGNESIUM - Normal    Magnesium 2.17     SARS-COV-2 PCR - Normal    Coronavirus 2019, PCR Not Detected      Narrative:     This assay has received FDA Emergency Use Authorization (EUA) and is only authorized for the duration of time that circumstances exist to justify the authorization of the emergency use of in vitro diagnostic tests for the detection of SARS-CoV-2 virus and/or diagnosis of COVID-19 infection under section 564(b)(1) of the Act, 21 U.S.C. 360bbb-3(b)(1). This assay is an in vitro diagnostic nucleic acid amplification test for the qualitative detection of SARS-CoV-2 from nasopharyngeal specimens and has been validated for use at [facility]. Negative results do not preclude COVID-19 infections and should not be used as the sole basis for diagnosis, treatment, or other management decisions.     SERIAL TROPONIN-INITIAL - Normal    Troponin I, High Sensitivity 16      Narrative:     Less than 99th percentile of normal range cutoff-  Female and children under 18 years old <35 ng/L; Male <54 ng/L: Negative  Repeat testing should be performed if  clinically indicated.     Female and children under 18 years old  ng/L; Male  ng/L:  Consistent with possible cardiac damage and possible increased clinical   risk. Serial measurements may help to assess extent of myocardial damage.     >120 ng/L: Consistent with cardiac damage, increased clinical risk and  myocardial infarction. Serial measurements may help assess extent of   myocardial damage.      NOTE: Children less than 1 year old may have higher baseline troponin   levels and results should be interpreted in conjunction with the overall   clinical context.    NOTE: Troponin I testing is performed using a different   testing methodology at [facility] than at other   [facility]. Direct result comparisons should only   be made within the same method.     SERIAL TROPONIN, 1 HOUR - Normal    Troponin I, High Sensitivity 16      Narrative:     Less than 99th percentile of normal range cutoff-  Female and children under 18 years old <35 ng/L; Male <54 ng/L: Negative  Repeat testing should be performed if clinically indicated.     Female and children under 18 years old  ng/L; Male  ng/L:  Consistent with possible cardiac damage and possible increased clinical   risk. Serial measurements may help to assess extent of myocardial damage.     >120 ng/L: Consistent with cardiac damage, increased clinical risk and  myocardial infarction. Serial measurements may help assess extent of   myocardial damage.      NOTE: Children less than 1 year old may have higher baseline troponin   levels and results should be interpreted in conjunction with the overall   clinical context.    NOTE: Troponin I testing is performed using a different   testing methodology at [facility] than at other   [facility]. Direct result comparisons should only   be made within the same method.     URINE CULTURE   URINE CULTURE   TROPONIN SERIES- (INITIAL, 1 HR)    Narrative:     The following  orders were created for panel order Troponin I Series, High Sensitivity (0, 1 HR).  Procedure                               Abnormality         Status                     ---------                               -----------         ------                     Troponin I, High Sensiti...[[id number]]  Normal              Final result               Troponin, High Sensitivi...[[id number]]  Normal              Final result                 Please view results for these tests on the individual orders.   ELECTROLYTE PANEL, URINE    Sodium, Urine Random 69      Sodium/Creatinine Ratio 90      Potassium, Urine Random 33      Potassium/Creatinine Ratio 43      Chloride, Urine Random 61      Chloride/Creatinine Ratio 79      Creatinine, Urine Random 76.8     URINALYSIS WITH REFLEX CULTURE AND MICROSCOPIC    Narrative:     The following orders were created for panel order Urinalysis with Reflex Culture and Microscopic.  Procedure                               Abnormality         Status                     ---------                               -----------         ------                     Urinalysis with Reflex C...[[id number]]  Abnormal            Final result               Extra Urine Gray Tube[[id number]]                            In process                   Please view results for these tests on the individual orders.   EXTRA URINE GRAY TUBE   CBC WITH AUTO DIFFERENTIAL   RENAL FUNCTION PANEL   MAGNESIUM   POCT GLUCOSE   POCT GLUCOSE       All other labs were within normal range or not returned as of this dictation.    Imaging  XR tibia fibula bilateral 2 views   Final Result   No acute fracture. Generalized diffuse osteopenia.        Mild soft tissue swelling noted.        MACRO:   None        Signed by: Jd Washington 5/21/2024 2:43 AM   Dictation workstation:   [id number]      XR shoulder right 2+ views   Final Result   No acute osseous abnormality of the right shoulder or humerus.        Moderate right shoulder  osteoarthrosis.        I personally reviewed the images/study and I agree with the findings   as stated by Vitaliy Aguirre MD. This study was interpreted at   [place], [place].        MACRO:   None        Signed by: Jd Washington 5/21/2024 2:42 AM   Dictation workstation:   [id number]      XR humerus right   Final Result   No acute osseous abnormality of the right shoulder or humerus.        Moderate right shoulder osteoarthrosis.        I personally reviewed the images/study and I agree with the findings   as stated by Vitaliy Aguirre MD. This study was interpreted at   [place], [place].        MACRO:   None        Signed by: Jd Washington 5/21/2024 2:42 AM   Dictation workstation:   [id number]      CT head wo IV contrast   Final Result   1. No acute intracranial abnormality or calvarial fracture.   2. Scalp swelling about the left paramidline vertex and right   frontotemporal scalp.   3. Chronic changes as noted above.   4. No acute fracture or traumatic subluxation of the cervical spine.   5. Multilevel degenerative changes of the cervical spine as described   above.        I personally reviewed the images/study and I agree with the findings   as stated by Vitaliy Aguirre MD. This study was interpreted at   [place], [place].        MACRO:   None        Signed by: Jd Washington 5/21/2024 2:40 AM   Dictation workstation:   [id number]      CT cervical spine wo IV contrast   Final Result   1. No acute intracranial abnormality or calvarial fracture.   2. Scalp swelling about the left paramidline vertex and right   frontotemporal scalp.   3. Chronic changes as noted above.   4. No acute fracture or traumatic subluxation of the cervical spine.   5. Multilevel degenerative changes of the cervical spine as described   above.        I personally reviewed the images/study  and I agree with the findings   as stated by Vitaliy Aguirre MD. This study was interpreted at   [facility], [place], [place].        MACRO:   None        Signed by: Jd Washington 5/21/2024 2:40 AM   Dictation workstation:   [id number]           DISPO:  Remained hemodynamically stable while under my care.    ED Course as of 05/21/24 1137   Tue May 21, 2024   0445 ECG 12 lead  EKG interpreted by me.  5/21/2024 at 0206.  Sinus rhythm 74 bpm.    QTc 475.  No ST elevation. [MC]      ED Course User Index  [MC] Hayden Jurado MD         Diagnoses as of 05/21/24 1137   Fall, initial encounter   Syncope, unspecified syncope type         I have reviewed this case with the ED attending physician, and the attending agrees with the plan. Patient or family was counselled regarding labs, imaging, likely diagnosis, and plan. All questions were answered.     Lubna Yanez DO  PGY-4, emergency medicine    The above documentation was completed with the use of speech recognition software. It may contain dictation errors secondary to limitations of the software.     Lubna Yanez DO  Resident  05/23/24 0020

## 2024-05-22 LAB
ALBUMIN SERPL BCP-MCNC: 3.4 G/DL (ref 3.4–5)
ALP SERPL-CCNC: 51 U/L (ref 33–136)
ALT SERPL W P-5'-P-CCNC: 3 U/L (ref 7–45)
ANION GAP SERPL CALC-SCNC: 15 MMOL/L (ref 10–20)
AST SERPL W P-5'-P-CCNC: 8 U/L (ref 9–39)
ATRIAL RATE: 74 BPM
BACTERIA UR CULT: NORMAL
BILIRUB SERPL-MCNC: 0.4 MG/DL (ref 0–1.2)
BUN SERPL-MCNC: 53 MG/DL (ref 6–23)
CALCIUM SERPL-MCNC: 9.1 MG/DL (ref 8.6–10.6)
CHLORIDE SERPL-SCNC: 99 MMOL/L (ref 98–107)
CO2 SERPL-SCNC: 29 MMOL/L (ref 21–32)
CREAT SERPL-MCNC: 2.22 MG/DL (ref 0.5–1.05)
EGFRCR SERPLBLD CKD-EPI 2021: 21 ML/MIN/1.73M*2
ERYTHROCYTE [DISTWIDTH] IN BLOOD BY AUTOMATED COUNT: 15.4 % (ref 11.5–14.5)
GLUCOSE BLD MANUAL STRIP-MCNC: 141 MG/DL (ref 74–99)
GLUCOSE BLD MANUAL STRIP-MCNC: 168 MG/DL (ref 74–99)
GLUCOSE BLD MANUAL STRIP-MCNC: 248 MG/DL (ref 74–99)
GLUCOSE BLD MANUAL STRIP-MCNC: 272 MG/DL (ref 74–99)
GLUCOSE SERPL-MCNC: 127 MG/DL (ref 74–99)
HCT VFR BLD AUTO: 38.7 % (ref 36–46)
HGB BLD-MCNC: 12.1 G/DL (ref 12–16)
MAGNESIUM SERPL-MCNC: 2.16 MG/DL (ref 1.6–2.4)
MCH RBC QN AUTO: 31.2 PG (ref 26–34)
MCHC RBC AUTO-ENTMCNC: 31.3 G/DL (ref 32–36)
MCV RBC AUTO: 100 FL (ref 80–100)
NRBC BLD-RTO: 0 /100 WBCS (ref 0–0)
P AXIS: 73 DEGREES
P OFFSET: 188 MS
P ONSET: 126 MS
PLATELET # BLD AUTO: 215 X10*3/UL (ref 150–450)
POTASSIUM SERPL-SCNC: 3.5 MMOL/L (ref 3.5–5.3)
PR INTERVAL: 184 MS
PROT SERPL-MCNC: 6.8 G/DL (ref 6.4–8.2)
Q ONSET: 218 MS
QRS COUNT: 12 BEATS
QRS DURATION: 102 MS
QT INTERVAL: 428 MS
QTC CALCULATION(BAZETT): 475 MS
QTC FREDERICIA: 459 MS
R AXIS: 59 DEGREES
RBC # BLD AUTO: 3.88 X10*6/UL (ref 4–5.2)
SODIUM SERPL-SCNC: 139 MMOL/L (ref 136–145)
T AXIS: 64 DEGREES
T OFFSET: 432 MS
VENTRICULAR RATE: 74 BPM
WBC # BLD AUTO: 8.1 X10*3/UL (ref 4.4–11.3)

## 2024-05-22 PROCEDURE — 2500000001 HC RX 250 WO HCPCS SELF ADMINISTERED DRUGS (ALT 637 FOR MEDICARE OP)

## 2024-05-22 PROCEDURE — 83735 ASSAY OF MAGNESIUM: CPT | Performed by: INTERNAL MEDICINE

## 2024-05-22 PROCEDURE — 2500000001 HC RX 250 WO HCPCS SELF ADMINISTERED DRUGS (ALT 637 FOR MEDICARE OP): Performed by: INTERNAL MEDICINE

## 2024-05-22 PROCEDURE — 82947 ASSAY GLUCOSE BLOOD QUANT: CPT

## 2024-05-22 PROCEDURE — 1200000002 HC GENERAL ROOM WITH TELEMETRY DAILY

## 2024-05-22 PROCEDURE — 2500000004 HC RX 250 GENERAL PHARMACY W/ HCPCS (ALT 636 FOR OP/ED): Performed by: INTERNAL MEDICINE

## 2024-05-22 PROCEDURE — 85027 COMPLETE CBC AUTOMATED: CPT | Performed by: INTERNAL MEDICINE

## 2024-05-22 PROCEDURE — 80053 COMPREHEN METABOLIC PANEL: CPT | Performed by: INTERNAL MEDICINE

## 2024-05-22 PROCEDURE — 2500000002 HC RX 250 W HCPCS SELF ADMINISTERED DRUGS (ALT 637 FOR MEDICARE OP, ALT 636 FOR OP/ED)

## 2024-05-22 PROCEDURE — 36415 COLL VENOUS BLD VENIPUNCTURE: CPT | Performed by: INTERNAL MEDICINE

## 2024-05-22 PROCEDURE — 99232 SBSQ HOSP IP/OBS MODERATE 35: CPT | Performed by: INTERNAL MEDICINE

## 2024-05-22 PROCEDURE — 2500000004 HC RX 250 GENERAL PHARMACY W/ HCPCS (ALT 636 FOR OP/ED)

## 2024-05-22 RX ORDER — ONDANSETRON HYDROCHLORIDE 2 MG/ML
4 INJECTION, SOLUTION INTRAVENOUS EVERY 8 HOURS PRN
Status: DISCONTINUED | OUTPATIENT
Start: 2024-05-22 | End: 2024-05-28 | Stop reason: HOSPADM

## 2024-05-22 RX ADMIN — LEVOTHYROXINE SODIUM 150 MCG: 0.07 TABLET ORAL at 09:48

## 2024-05-22 RX ADMIN — ONDANSETRON 4 MG: 2 INJECTION INTRAMUSCULAR; INTRAVENOUS at 09:54

## 2024-05-22 RX ADMIN — ATORVASTATIN CALCIUM 20 MG: 20 TABLET, FILM COATED ORAL at 09:49

## 2024-05-22 RX ADMIN — HEPARIN SODIUM 5000 UNITS: 5000 INJECTION INTRAVENOUS; SUBCUTANEOUS at 12:43

## 2024-05-22 RX ADMIN — HEPARIN SODIUM 5000 UNITS: 5000 INJECTION INTRAVENOUS; SUBCUTANEOUS at 20:45

## 2024-05-22 RX ADMIN — POLYETHYLENE GLYCOL 3350 17 G: 17 POWDER, FOR SOLUTION ORAL at 09:52

## 2024-05-22 RX ADMIN — ACETAMINOPHEN 975 MG: 325 TABLET ORAL at 12:44

## 2024-05-22 RX ADMIN — CARVEDILOL 25 MG: 12.5 TABLET, FILM COATED ORAL at 09:49

## 2024-05-22 RX ADMIN — HYDRALAZINE HYDROCHLORIDE 50 MG: 25 TABLET ORAL at 09:50

## 2024-05-22 RX ADMIN — ACETAMINOPHEN 975 MG: 325 TABLET ORAL at 04:20

## 2024-05-22 RX ADMIN — CARVEDILOL 25 MG: 12.5 TABLET, FILM COATED ORAL at 18:42

## 2024-05-22 RX ADMIN — CLOPIDOGREL BISULFATE 75 MG: 75 TABLET ORAL at 09:51

## 2024-05-22 RX ADMIN — HEPARIN SODIUM 5000 UNITS: 5000 INJECTION INTRAVENOUS; SUBCUTANEOUS at 04:19

## 2024-05-22 RX ADMIN — INSULIN LISPRO 3 UNITS: 100 INJECTION, SOLUTION INTRAVENOUS; SUBCUTANEOUS at 18:42

## 2024-05-22 RX ADMIN — INSULIN LISPRO 2 UNITS: 100 INJECTION, SOLUTION INTRAVENOUS; SUBCUTANEOUS at 13:07

## 2024-05-22 RX ADMIN — SACUBITRIL AND VALSARTAN 1 TABLET: 49; 51 TABLET, FILM COATED ORAL at 09:51

## 2024-05-22 RX ADMIN — ACETAMINOPHEN 975 MG: 325 TABLET ORAL at 20:45

## 2024-05-22 RX ADMIN — SODIUM CHLORIDE, POTASSIUM CHLORIDE, SODIUM LACTATE AND CALCIUM CHLORIDE 500 ML: 600; 310; 30; 20 INJECTION, SOLUTION INTRAVENOUS at 12:43

## 2024-05-22 ASSESSMENT — ACTIVITIES OF DAILY LIVING (ADL): LACK_OF_TRANSPORTATION: NO

## 2024-05-22 ASSESSMENT — PAIN DESCRIPTION - LOCATION: LOCATION: HEAD

## 2024-05-22 ASSESSMENT — PAIN SCALES - GENERAL: PAINLEVEL_OUTOF10: 6

## 2024-05-22 NOTE — PROGRESS NOTES
"Kristy Sanchez is a 83 y.o. female on day 1 of admission presenting with Fall, initial encounter.    Subjective     No events overnight.   Reports nausea,   Feels dizzy while standing, with drop in BP on standing,     RN reported nausea.        Objective     Physical Exam  Constitutional:       Comments: Elderly lady, alert and oriented x 3  Comfortable at rest    HENT:      Head: Normocephalic.      Nose: Nose normal.   Eyes:      Extraocular Movements: Extraocular movements intact.      Pupils: Pupils are equal, round, and reactive to light.   Cardiovascular:      Rate and Rhythm: Normal rate and regular rhythm.      Heart sounds: Normal heart sounds. No murmur heard.  Pulmonary:      Effort: Pulmonary effort is normal. No respiratory distress.      Breath sounds: Normal breath sounds. No wheezing.   Abdominal:      General: There is no distension.      Palpations: Abdomen is soft.      Tenderness: There is no abdominal tenderness.   Musculoskeletal:         General: Normal range of motion.      Cervical back: Normal range of motion.   Skin:     General: Skin is warm.   Neurological:      General: No focal deficit present.      Mental Status: She is oriented to person, place, and time. Mental status is at baseline.   Psychiatric:         Mood and Affect: Mood normal.         Last Recorded Vitals  Blood pressure 131/79, pulse 74, temperature 36.2 °C (97.2 °F), temperature source Temporal, resp. rate 20, height 1.676 m (5' 6\"), weight 81.6 kg (180 lb), SpO2 100%.  Intake/Output last 3 Shifts:  I/O last 3 completed shifts:  In: - (0 mL/kg)   Out: 240 (2.9 mL/kg) [Urine:240 (0.1 mL/kg/hr)]  Weight: 81.6 kg     Relevant Results  Scheduled medications  acetaminophen, 975 mg, oral, q8h  allopurinol, 50 mg, oral, q48h  atorvastatin, 20 mg, oral, Daily  carvedilol, 25 mg, oral, BID  clopidogrel, 75 mg, oral, Daily  [Held by provider] empagliflozin, 10 mg, oral, Daily  [Held by provider] furosemide, 40 mg, oral, " Daily  heparin (porcine), 5,000 Units, subcutaneous, q8h  [Held by provider] hydrALAZINE, 50 mg, oral, BID  insulin lispro, 0-5 Units, subcutaneous, TID  lactated Ringer's, 500 mL, intravenous, Once  levothyroxine, 150 mcg, oral, Daily  polyethylene glycol, 17 g, oral, Daily  sacubitriL-valsartan, 1 tablet, oral, BID  [Held by provider] spironolactone, 12.5 mg, oral, Daily      Continuous medications     PRN medications  PRN medications: dextrose, dextrose, glucagon, glucagon, ondansetron, oxyCODONE    Results for orders placed or performed during the hospital encounter of 05/21/24 (from the past 24 hour(s))   CBC and Auto Differential   Result Value Ref Range    WBC 9.7 4.4 - 11.3 x10*3/uL    nRBC 0.0 0.0 - 0.0 /100 WBCs    RBC 3.54 (L) 4.00 - 5.20 x10*6/uL    Hemoglobin 11.2 (L) 12.0 - 16.0 g/dL    Hematocrit 32.5 (L) 36.0 - 46.0 %    MCV 92 80 - 100 fL    MCH 31.6 26.0 - 34.0 pg    MCHC 34.5 32.0 - 36.0 g/dL    RDW 15.0 (H) 11.5 - 14.5 %    Platelets 218 150 - 450 x10*3/uL    Neutrophils % 61.2 40.0 - 80.0 %    Immature Granulocytes %, Automated 0.3 0.0 - 0.9 %    Lymphocytes % 20.7 13.0 - 44.0 %    Monocytes % 6.0 2.0 - 10.0 %    Eosinophils % 11.5 0.0 - 6.0 %    Basophils % 0.3 0.0 - 2.0 %    Neutrophils Absolute 5.94 (H) 1.60 - 5.50 x10*3/uL    Immature Granulocytes Absolute, Automated 0.03 0.00 - 0.50 x10*3/uL    Lymphocytes Absolute 2.01 0.80 - 3.00 x10*3/uL    Monocytes Absolute 0.58 0.05 - 0.80 x10*3/uL    Eosinophils Absolute 1.12 (H) 0.00 - 0.40 x10*3/uL    Basophils Absolute 0.03 0.00 - 0.10 x10*3/uL   Renal function panel   Result Value Ref Range    Glucose 169 (H) 74 - 99 mg/dL    Sodium 139 136 - 145 mmol/L    Potassium 3.4 (L) 3.5 - 5.3 mmol/L    Chloride 103 98 - 107 mmol/L    Bicarbonate 25 21 - 32 mmol/L    Anion Gap 14 10 - 20 mmol/L    Urea Nitrogen 46 (H) 6 - 23 mg/dL    Creatinine 1.89 (H) 0.50 - 1.05 mg/dL    eGFR 26 (L) >60 mL/min/1.73m*2    Calcium 7.6 (L) 8.6 - 10.6 mg/dL    Phosphorus  3.7 2.5 - 4.9 mg/dL    Albumin 3.1 (L) 3.4 - 5.0 g/dL   Magnesium   Result Value Ref Range    Magnesium 1.72 1.60 - 2.40 mg/dL   POCT GLUCOSE   Result Value Ref Range    POCT Glucose 192 (H) 74 - 99 mg/dL   POCT GLUCOSE   Result Value Ref Range    POCT Glucose 239 (H) 74 - 99 mg/dL   CBC   Result Value Ref Range    WBC 8.1 4.4 - 11.3 x10*3/uL    nRBC 0.0 0.0 - 0.0 /100 WBCs    RBC 3.88 (L) 4.00 - 5.20 x10*6/uL    Hemoglobin 12.1 12.0 - 16.0 g/dL    Hematocrit 38.7 36.0 - 46.0 %     80 - 100 fL    MCH 31.2 26.0 - 34.0 pg    MCHC 31.3 (L) 32.0 - 36.0 g/dL    RDW 15.4 (H) 11.5 - 14.5 %    Platelets 215 150 - 450 x10*3/uL   Comprehensive Metabolic Panel   Result Value Ref Range    Glucose 127 (H) 74 - 99 mg/dL    Sodium 139 136 - 145 mmol/L    Potassium 3.5 3.5 - 5.3 mmol/L    Chloride 99 98 - 107 mmol/L    Bicarbonate 29 21 - 32 mmol/L    Anion Gap 15 10 - 20 mmol/L    Urea Nitrogen 53 (H) 6 - 23 mg/dL    Creatinine 2.22 (H) 0.50 - 1.05 mg/dL    eGFR 21 (L) >60 mL/min/1.73m*2    Calcium 9.1 8.6 - 10.6 mg/dL    Albumin 3.4 3.4 - 5.0 g/dL    Alkaline Phosphatase 51 33 - 136 U/L    Total Protein 6.8 6.4 - 8.2 g/dL    AST 8 (L) 9 - 39 U/L    Bilirubin, Total 0.4 0.0 - 1.2 mg/dL    ALT 3 (L) 7 - 45 U/L   Magnesium   Result Value Ref Range    Magnesium 2.16 1.60 - 2.40 mg/dL   POCT GLUCOSE   Result Value Ref Range    POCT Glucose 141 (H) 74 - 99 mg/dL         Assessment/Plan   Principal Problem:    Fall, initial encounter  Active Problems:    Syncope    Kristy Sanchez is an 83 year old lady with PMH significant for HFrEF (45%), JENELLE (on CPAP), CKD 4, HTN, HLD, T2DM, Hypothyroidism, gout, Hx CVA (2017 on clopidogrel), Hx breast cancer s/p surgery and chemo (2004, reportedly in remission) who is presented to [facility] ED on 5/21 for Dizziness and fall. She is positive for orthostatic drop in blood pressure.     #Dizziness, Fall:  -History most c/w orthostasis, (potentially iso uptitration of GDMT and overdiuresis)  -Imaging  in ED including CT head/C spine without fracture  -s/p 1L LR in ED   -Holding diuretics,   Orthostatics still positive on 5/22  Try  ml bolus,   Monitor,      #SHAHAB on CKD IV  -Recent SCr 1.71 (5/16 per care everywhere at [facility]),elevated  to 2.32 in ED  -Recently had Lasix increased to 40 daily and was started on entresto 49-51 BID at [facility],   reportedly followed instructions for Lisinopril washout, was also started on spironolactone on discharge.  Monitor   -Holding spironolactone and empagliflozin iso SHAHAB       #HTN  #HFrEF  -EF 45% at OSH (5/15)  Continue  carvedilol 25 mg BID,   Continue Entresto 49-51,   Holding jardiance 10,   Hold hydralazine 50 BID, jr 12.5, Lasix 40,   atorvastatin 20       #Hypothyroidism  -On levothyroxine 150 mcg daily, last TSH 14.3 (4/6 at [facility])       #Hx CVA  -On clopidogrel 75 daily     #Gout  -Continue home allopurinol 50 every other day     #T2DM  - last HBA1C 6.7 (10/2023)  -On empagliflozin for cotreatment with HFrEF  -holding empagliflozin  -SSI while inpatient     F: PRN  E: PRn  N: Cardiac  A: PIV     DVT ppx: heparin  GI ppx: not indicated    Code Status: DNR/DNI (confirmed on admission)  Surrogate Medical Decision-maker: Mike (son) [phone]    PT/ OT evaluation.     Octavio Coffey MD

## 2024-05-22 NOTE — PROGRESS NOTES
05/22/24 1313   Discharge Planning   Living Arrangements Alone   Support Systems Children   Assistance Needed none   Type of Residence Private residence   Number of Stairs to Enter Residence 3   Do you have animals or pets at home? No   Who is requesting discharge planning? Provider   Home or Post Acute Services None   Patient expects to be discharged to: home   Financial Resource Strain   How hard is it for you to pay for the very basics like food, housing, medical care, and heating? Not hard   Housing Stability   In the last 12 months, was there a time when you were not able to pay the mortgage or rent on time? N   In the last 12 months, how many places have you lived? 1   In the last 12 months, was there a time when you did not have a steady place to sleep or slept in a shelter (including now)? N   Transportation Needs   In the past 12 months, has lack of transportation kept you from medical appointments or from getting medications? no   In the past 12 months, has lack of transportation kept you from meetings, work, or from getting things needed for daily living? No     LISW spoke with the patient on this date.  Patient reported that she lives in an apartment alone.  Patient uses a walker and a cane at times for ambulation.  Patient reported she goes to [facility], [place] most of the time and they provide transport to medical appointments

## 2024-05-22 NOTE — PROGRESS NOTES
Kristy Sanchez is a 83 y.o. female on day 1 of admission presenting with Fall, initial encounter.  Transitional Care Coordination Progress Note:  Patient discussed during interdisciplinary rounds.   Team members present: MD and TCC  Plan per Medical/Surgical team: Patient came in with CHFREF. Adjusting HF medications. Positive Orthostatics Waiting on PT/OT Evaluation.  Payor: [employer]  Discharge disposition: Waiting on PT/OT recommendations.  Potential Barriers:   ADOD: 05/24/24    MALCOLM ESPINO RN

## 2024-05-22 NOTE — PROGRESS NOTES
Physical Therapy                 Therapy Communication Note    Patient Name: Kristy Sanchez  MRN: [id number]  Today's Date: 5/22/2024     Discipline: Physical Therapy    Missed Visit Reason: Missed Visit Reason: Patient refused, Other (Comment) (Pt reports not feeling good; per RN Taylor she was + orthostatic today (bolus running))    Missed Time: Attempt    Comment: 13:56pm

## 2024-05-22 NOTE — ED PROVIDER NOTES
HPI:  Patient is an 83-year-old female with history of HFrEF, CKD stage IV, hypertension, hyperlipidemia, type 2 diabetes, hypothyroidism, CVA (2017, on Plavix), breast cancer status postchemotherapy (reportedly in remission since 2004) who presents via EMS from home for evaluation status post syncopal episode.  Patient states while she was sitting on the commode, she became lightheaded and subsequently lost consciousness, hitting her head on the sink prior to hitting the floor.  Her  subsequently called EMS.  She currently complains of a generalized headache but denies vision changes or focal/lateralizing neurologic complaints.  No neck or back pain.  No chest pain or shortness of breath.  No nausea or vomiting.    ROS: A 10-system ROS was performed and was negative except as documented in the HPI.    PMH/PSH: Reviewed in EMR. As above in HPI.  SH: Denies EtOH, tobacco or illicit drug use.  Allergies:   Allergies   Allergen Reactions    Aspirin Nausea/vomiting    Ciprofloxacin Headache and Nausea/vomiting    Isosorbide Headache and Nausea/vomiting    Nsaids (Non-Steroidal Anti-Inflammatory Drug) Other     Hx of CVA      Medications: See prescription writer for full medication list.     General: no acute distress, appropriate conversation  HEENT:  No rhinorrhea. MMM.  Palpable hematoma to the right frontotemporal scalp with no overlying abrasions, lacerations or other lesions PERRL.  Neck: No midline or paraspinal C-spine tenderness to palpation.  No bony deformities or step-offs.  Cardiac: regular rate rhythm, no murmurs  Chest: No sternal or bilateral rib tenderness to palpation.  No bony deformities or step-offs.  No crepitus.  Pulm:  normal respiratory effort on room air, equal chest expansion, clear bilaterally, no wheeze or crackles  GI: soft, nontender, nondistended, +BS  Extremities: Pelvis is stable, moves all extremities freely Although there is diffuse tenderness to palpation of the right shoulder,  no bony deformities or step-offs.  2+ radial pulse, normal reported distal sensation to the right upper extremity, normal  strength. no edema noted  Back: No midline or paraspinal T/L-spine tenderness to palpation.  No bony deformities or step-offs.  Skin: warm, well-perfused, no lesions noted on exposed skin. No evidence of trauma.   Neuro:  AOx3, moves all 4 extremities freely and independently. No facial droop. Normal reported sensation to light touch in all 4 extremities distally. Appropriate and symmetric  strength bilaterally. Appropriate and symmetric plantar/dorsiflexion with resistance bilaterally.     Assessment/Plan/MDM  Patient is an 83-year-old female with history of HFrEF, CKD stage IV, hypertension, hyperlipidemia, type 2 diabetes, hypothyroidism, CVA (2017, on Plavix), breast cancer status postchemotherapy (reportedly in remission since 2004) who presents via EMS from home for evaluation status post syncopal episode.  There is no leukocytosis, anemia or electrolyte derangement.  Magnesium within normal limits. Delta troponin negative.  Patient is COVID-negative.  Urine culture pending.  No overt concern for UTI.  Plain films of the right upper extremity and bilateral tib-fib's negative for acute fracture or dislocation.  CT head without intracranial hemorrhage, CT C-spine without acute fracture or dislocation.  Patient accepted for admission to medicine  For syncope workup.    EKG shows normal sinus rhythm, rate 76, normal axis, normal MA interval, normal QTc, no ST elevation, no EKG available for comparison    ED Course/Progress:    ED Course as of 05/22/24 0308 Tue May 21, 2024   0445 ECG 12 lead  EKG interpreted by me.  5/21/2024 at 0206.  Sinus rhythm 74 bpm.    QTc 475.  No ST elevation. [MC]      ED Course User Index  [MC] Hayden Jurado MD         Diagnoses as of 05/22/24 0308   Fall, initial encounter   Syncope, unspecified syncope type        Clinical Impression: as  above  Dispo: admit to medicine    Pt seen and discussed with attending physician, Dr. Rhiannon Cruz MD  PGY3, Emergency Medicine    Disclaimer: This note was dictated by speech recognition. An attempt at proof reading was made to minimize errors. Errors in transcription may be present.  Please call if questions.      Shante Cruz MD  Resident  05/22/24 0517

## 2024-05-23 ENCOUNTER — APPOINTMENT (OUTPATIENT)
Dept: RADIOLOGY | Facility: HOSPITAL | Age: 84
DRG: 312 | End: 2024-05-23
Payer: COMMERCIAL

## 2024-05-23 LAB
ALBUMIN SERPL BCP-MCNC: 3.4 G/DL (ref 3.4–5)
ALP SERPL-CCNC: 52 U/L (ref 33–136)
ALT SERPL W P-5'-P-CCNC: <3 U/L (ref 7–45)
ANION GAP SERPL CALC-SCNC: 14 MMOL/L (ref 10–20)
AST SERPL W P-5'-P-CCNC: 9 U/L (ref 9–39)
BILIRUB SERPL-MCNC: 0.4 MG/DL (ref 0–1.2)
BUN SERPL-MCNC: 50 MG/DL (ref 6–23)
CALCIUM SERPL-MCNC: 9.1 MG/DL (ref 8.6–10.6)
CHLORIDE SERPL-SCNC: 98 MMOL/L (ref 98–107)
CO2 SERPL-SCNC: 25 MMOL/L (ref 21–32)
CREAT SERPL-MCNC: 2.03 MG/DL (ref 0.5–1.05)
EGFRCR SERPLBLD CKD-EPI 2021: 24 ML/MIN/1.73M*2
ERYTHROCYTE [DISTWIDTH] IN BLOOD BY AUTOMATED COUNT: 15.3 % (ref 11.5–14.5)
GLUCOSE BLD MANUAL STRIP-MCNC: 149 MG/DL (ref 74–99)
GLUCOSE BLD MANUAL STRIP-MCNC: 153 MG/DL (ref 74–99)
GLUCOSE BLD MANUAL STRIP-MCNC: 172 MG/DL (ref 74–99)
GLUCOSE BLD MANUAL STRIP-MCNC: 196 MG/DL (ref 74–99)
GLUCOSE BLD MANUAL STRIP-MCNC: 271 MG/DL (ref 74–99)
GLUCOSE SERPL-MCNC: 154 MG/DL (ref 74–99)
HCT VFR BLD AUTO: 42.8 % (ref 36–46)
HGB BLD-MCNC: 13.3 G/DL (ref 12–16)
MCH RBC QN AUTO: 32 PG (ref 26–34)
MCHC RBC AUTO-ENTMCNC: 31.1 G/DL (ref 32–36)
MCV RBC AUTO: 103 FL (ref 80–100)
NRBC BLD-RTO: 0 /100 WBCS (ref 0–0)
PLATELET # BLD AUTO: 230 X10*3/UL (ref 150–450)
POTASSIUM SERPL-SCNC: 4.2 MMOL/L (ref 3.5–5.3)
PROT SERPL-MCNC: 7.1 G/DL (ref 6.4–8.2)
RBC # BLD AUTO: 4.16 X10*6/UL (ref 4–5.2)
SODIUM SERPL-SCNC: 133 MMOL/L (ref 136–145)
WBC # BLD AUTO: 8.1 X10*3/UL (ref 4.4–11.3)

## 2024-05-23 PROCEDURE — 73610 X-RAY EXAM OF ANKLE: CPT | Mod: LEFT SIDE | Performed by: STUDENT IN AN ORGANIZED HEALTH CARE EDUCATION/TRAINING PROGRAM

## 2024-05-23 PROCEDURE — 1200000002 HC GENERAL ROOM WITH TELEMETRY DAILY

## 2024-05-23 PROCEDURE — 97161 PT EVAL LOW COMPLEX 20 MIN: CPT | Mod: GP | Performed by: PHYSICAL THERAPIST

## 2024-05-23 PROCEDURE — 97116 GAIT TRAINING THERAPY: CPT | Mod: GP | Performed by: PHYSICAL THERAPIST

## 2024-05-23 PROCEDURE — 99232 SBSQ HOSP IP/OBS MODERATE 35: CPT | Performed by: INTERNAL MEDICINE

## 2024-05-23 PROCEDURE — 80053 COMPREHEN METABOLIC PANEL: CPT | Performed by: INTERNAL MEDICINE

## 2024-05-23 PROCEDURE — 82947 ASSAY GLUCOSE BLOOD QUANT: CPT

## 2024-05-23 PROCEDURE — 2500000004 HC RX 250 GENERAL PHARMACY W/ HCPCS (ALT 636 FOR OP/ED)

## 2024-05-23 PROCEDURE — 36415 COLL VENOUS BLD VENIPUNCTURE: CPT | Performed by: INTERNAL MEDICINE

## 2024-05-23 PROCEDURE — 2500000001 HC RX 250 WO HCPCS SELF ADMINISTERED DRUGS (ALT 637 FOR MEDICARE OP): Performed by: INTERNAL MEDICINE

## 2024-05-23 PROCEDURE — 85027 COMPLETE CBC AUTOMATED: CPT | Performed by: INTERNAL MEDICINE

## 2024-05-23 PROCEDURE — 2500000002 HC RX 250 W HCPCS SELF ADMINISTERED DRUGS (ALT 637 FOR MEDICARE OP, ALT 636 FOR OP/ED)

## 2024-05-23 PROCEDURE — 73610 X-RAY EXAM OF ANKLE: CPT | Mod: LT

## 2024-05-23 PROCEDURE — 2500000001 HC RX 250 WO HCPCS SELF ADMINISTERED DRUGS (ALT 637 FOR MEDICARE OP)

## 2024-05-23 RX ORDER — BISACODYL 5 MG
10 TABLET, DELAYED RELEASE (ENTERIC COATED) ORAL ONCE
Status: COMPLETED | OUTPATIENT
Start: 2024-05-23 | End: 2024-05-23

## 2024-05-23 RX ADMIN — POLYETHYLENE GLYCOL 3350 17 G: 17 POWDER, FOR SOLUTION ORAL at 10:31

## 2024-05-23 RX ADMIN — HEPARIN SODIUM 5000 UNITS: 5000 INJECTION INTRAVENOUS; SUBCUTANEOUS at 05:00

## 2024-05-23 RX ADMIN — ALLOPURINOL 50 MG: 100 TABLET ORAL at 10:32

## 2024-05-23 RX ADMIN — CARVEDILOL 25 MG: 12.5 TABLET, FILM COATED ORAL at 17:00

## 2024-05-23 RX ADMIN — HEPARIN SODIUM 5000 UNITS: 5000 INJECTION INTRAVENOUS; SUBCUTANEOUS at 13:15

## 2024-05-23 RX ADMIN — CLOPIDOGREL BISULFATE 75 MG: 75 TABLET ORAL at 10:32

## 2024-05-23 RX ADMIN — SACUBITRIL AND VALSARTAN 1 TABLET: 24; 26 TABLET, FILM COATED ORAL at 10:34

## 2024-05-23 RX ADMIN — ACETAMINOPHEN 975 MG: 325 TABLET ORAL at 15:19

## 2024-05-23 RX ADMIN — ACETAMINOPHEN 975 MG: 325 TABLET ORAL at 05:02

## 2024-05-23 RX ADMIN — HEPARIN SODIUM 5000 UNITS: 5000 INJECTION INTRAVENOUS; SUBCUTANEOUS at 21:43

## 2024-05-23 RX ADMIN — INSULIN LISPRO 3 UNITS: 100 INJECTION, SOLUTION INTRAVENOUS; SUBCUTANEOUS at 19:10

## 2024-05-23 RX ADMIN — LEVOTHYROXINE SODIUM 150 MCG: 0.07 TABLET ORAL at 10:33

## 2024-05-23 RX ADMIN — ATORVASTATIN CALCIUM 20 MG: 20 TABLET, FILM COATED ORAL at 10:32

## 2024-05-23 RX ADMIN — BISACODYL 10 MG: 5 TABLET, COATED ORAL at 15:19

## 2024-05-23 RX ADMIN — SACUBITRIL AND VALSARTAN 1 TABLET: 24; 26 TABLET, FILM COATED ORAL at 21:43

## 2024-05-23 RX ADMIN — CARVEDILOL 25 MG: 12.5 TABLET, FILM COATED ORAL at 10:35

## 2024-05-23 RX ADMIN — ACETAMINOPHEN 975 MG: 325 TABLET ORAL at 21:43

## 2024-05-23 ASSESSMENT — ACTIVITIES OF DAILY LIVING (ADL)
ADLS_ADDRESSED: NO
ADL_ASSISTANCE: INDEPENDENT

## 2024-05-23 ASSESSMENT — PAIN - FUNCTIONAL ASSESSMENT: PAIN_FUNCTIONAL_ASSESSMENT: 0-10

## 2024-05-23 ASSESSMENT — COGNITIVE AND FUNCTIONAL STATUS - GENERAL
WALKING IN HOSPITAL ROOM: A LITTLE
MOVING FROM LYING ON BACK TO SITTING ON SIDE OF FLAT BED WITH BEDRAILS: A LITTLE
TURNING FROM BACK TO SIDE WHILE IN FLAT BAD: A LITTLE
MOBILITY SCORE: 17
STANDING UP FROM CHAIR USING ARMS: A LITTLE
MOVING TO AND FROM BED TO CHAIR: A LITTLE
CLIMB 3 TO 5 STEPS WITH RAILING: A LOT

## 2024-05-23 ASSESSMENT — PAIN SCALES - GENERAL: PAINLEVEL_OUTOF10: 0 - NO PAIN

## 2024-05-23 NOTE — PROGRESS NOTES
Kristy Sanchez is a 83 y.o. female on day 2 of admission presenting with Fall, initial encounter.    Transitional Care Coordination Progress Note:  Patient discussed during interdisciplinary rounds.   Team members present: MD and TCC  Plan per Medical/Surgical team: Patient waiting on PT/OT Evaluation, then placement if needed.  Payor: [employer]  Discharge disposition: Waiting on PT/OT recommendations.  Potential Barriers: None  ADOD: 05/25/24      MALCOLM ESPINO RN

## 2024-05-23 NOTE — PROGRESS NOTES
"Kristy Sanchez is a 83 y.o. female on day 2 of admission presenting with Fall, initial encounter.    Subjective     No events overnight.   Reports no further nausea,   Not dizzy while working with PT,  Reported pain while placing weight on left ankle  Also reporting constipation.     RN reported headache.        Objective     Physical Exam  Constitutional:       Comments: Elderly lady, alert and oriented x 3  Comfortable at rest    HENT:      Head: Normocephalic.      Nose: Nose normal.   Eyes:      Extraocular Movements: Extraocular movements intact.      Pupils: Pupils are equal, round, and reactive to light.   Cardiovascular:      Rate and Rhythm: Normal rate and regular rhythm.      Heart sounds: Normal heart sounds. No murmur heard.  Pulmonary:      Effort: Pulmonary effort is normal. No respiratory distress.      Breath sounds: Normal breath sounds. No wheezing.   Abdominal:      General: There is no distension.      Palpations: Abdomen is soft.      Tenderness: There is no abdominal tenderness.   Musculoskeletal:         General: Normal range of motion.      Cervical back: Normal range of motion.   Skin:     General: Skin is warm.   Neurological:      General: No focal deficit present.      Mental Status: She is oriented to person, place, and time. Mental status is at baseline.   Psychiatric:         Mood and Affect: Mood normal.         Last Recorded Vitals  Blood pressure 127/78, pulse 64, temperature 36.2 °C (97.2 °F), resp. rate 16, height 1.676 m (5' 6\"), weight 81.6 kg (180 lb), SpO2 96%.  Intake/Output last 3 Shifts:  I/O last 3 completed shifts:  In: 1098 (13.4 mL/kg) [P.O.:598; IV Piggyback:500]  Out: 640 (7.8 mL/kg) [Urine:640 (0.2 mL/kg/hr)]  Weight: 81.6 kg     Relevant Results  Scheduled medications  acetaminophen, 975 mg, oral, q8h  allopurinol, 50 mg, oral, q48h  atorvastatin, 20 mg, oral, Daily  bisacodyl, 10 mg, oral, Once  carvedilol, 25 mg, oral, BID  clopidogrel, 75 mg, oral, Daily  [Held by " provider] empagliflozin, 10 mg, oral, Daily  [Held by provider] furosemide, 40 mg, oral, Daily  heparin (porcine), 5,000 Units, subcutaneous, q8h  [Held by provider] hydrALAZINE, 50 mg, oral, BID  insulin lispro, 0-5 Units, subcutaneous, TID  levothyroxine, 150 mcg, oral, Daily  polyethylene glycol, 17 g, oral, Daily  sacubitriL-valsartan, 1 tablet, oral, BID  [Held by provider] spironolactone, 12.5 mg, oral, Daily      Continuous medications     PRN medications  PRN medications: dextrose, dextrose, glucagon, glucagon, ondansetron    Results for orders placed or performed during the hospital encounter of 05/21/24 (from the past 24 hour(s))   POCT GLUCOSE   Result Value Ref Range    POCT Glucose 248 (H) 74 - 99 mg/dL   POCT GLUCOSE   Result Value Ref Range    POCT Glucose 272 (H) 74 - 99 mg/dL   POCT GLUCOSE   Result Value Ref Range    POCT Glucose 168 (H) 74 - 99 mg/dL   POCT GLUCOSE   Result Value Ref Range    POCT Glucose 149 (H) 74 - 99 mg/dL   CBC   Result Value Ref Range    WBC 8.1 4.4 - 11.3 x10*3/uL    nRBC 0.0 0.0 - 0.0 /100 WBCs    RBC 4.16 4.00 - 5.20 x10*6/uL    Hemoglobin 13.3 12.0 - 16.0 g/dL    Hematocrit 42.8 36.0 - 46.0 %     (H) 80 - 100 fL    MCH 32.0 26.0 - 34.0 pg    MCHC 31.1 (L) 32.0 - 36.0 g/dL    RDW 15.3 (H) 11.5 - 14.5 %    Platelets 230 150 - 450 x10*3/uL   Comprehensive Metabolic Panel   Result Value Ref Range    Glucose 154 (H) 74 - 99 mg/dL    Sodium 133 (L) 136 - 145 mmol/L    Potassium 4.2 3.5 - 5.3 mmol/L    Chloride 98 98 - 107 mmol/L    Bicarbonate 25 21 - 32 mmol/L    Anion Gap 14 10 - 20 mmol/L    Urea Nitrogen 50 (H) 6 - 23 mg/dL    Creatinine 2.03 (H) 0.50 - 1.05 mg/dL    eGFR 24 (L) >60 mL/min/1.73m*2    Calcium 9.1 8.6 - 10.6 mg/dL    Albumin 3.4 3.4 - 5.0 g/dL    Alkaline Phosphatase 52 33 - 136 U/L    Total Protein 7.1 6.4 - 8.2 g/dL    AST 9 9 - 39 U/L    Bilirubin, Total 0.4 0.0 - 1.2 mg/dL    ALT <3 (L) 7 - 45 U/L         Assessment/Plan   Principal Problem:     Fall, initial encounter  Active Problems:    Syncope    Kristy Sanchez is an 83 year old lady with PMH significant for HFrEF (45%), JENELLE (on CPAP), CKD 4, HTN, HLD, T2DM, Hypothyroidism, gout, Hx CVA (2017 on clopidogrel), Hx breast cancer s/p surgery and chemo (2004, reportedly in remission) who is presented to [facility] ED on 5/21 for Dizziness and fall. She is positive for orthostatic drop in blood pressure.     #Dizziness, Fall:  -History most c/w orthostasis, (potentially iso uptitration of GDMT and overdiuresis)  -Imaging in ED including CT head/C spine without fracture  -s/p 1L LR in ED   -Holding diuretics,   Orthostatics still positive on 5/22  Rx with  ml bolus,   Monitor,     #HTN  #HFrEF  -EF 45% at OSH (5/15)  Continue  carvedilol 25 mg BID,   Change Entresto to 24/26 mg BID,    Holding jardiance 10,   Hold hydralazine 50 BID, jr 12.5, Lasix 40,   Continue atorvastatin 20       #SHAHAB on CKD IV  -Recent SCr 1.71 (5/16 per care everywhere at [facility]),elevated  to 2.32 in ED  -Recently had Lasix increased to 40 daily and was started on entresto 49-51 BID at [facility],   reportedly followed instructions for Lisinopril washout, was also started on spironolactone on discharge.  Monitor   -Holding spironolactone and empagliflozin iso SHAHAB  Creatinine is stable at 2.0    Left ankle pain:  Check X-ray,         #Hypothyroidism  -On levothyroxine 150 mcg daily, last TSH 14.3 (4/6 at [facility])       #Hx CVA  -On clopidogrel 75 daily     #Gout  -Continue home allopurinol 50 every other day     #T2DM  - last HBA1C 6.7 (10/2023)  -On empagliflozin for cotreatment with HFrEF  -holding empagliflozin  -SSI while inpatient     F: PRN  E: PRn  N: Cardiac  A: PIV     DVT ppx: heparin  GI ppx: not indicated    Code Status: DNR/DNI (confirmed on admission)  Surrogate Medical Decision-maker: Mike (son) [phone]    PT/ OT evaluation.     Dispo: Optimizing HF rx , and adjusting meds for orthostatic hypotension. Anticipate  discharge 1 to 2 days.     Octavio Coffey MD

## 2024-05-23 NOTE — PROGRESS NOTES
Physical Therapy    Physical Therapy Evaluation & Treatment    Patient Name: Kristy Sanchez  MRN: [id number]  Today's Date: 5/23/2024   Time Calculation  Start Time: 0811  Stop Time: 0841  Time Calculation (min): 30 min    Assessment/Plan   PT Assessment  PT Assessment Results: Pain, Decreased strength, Decreased endurance, Impaired balance, Decreased mobility  Rehab Prognosis: Excellent  Evaluation/Treatment Tolerance: Patient tolerated treatment well  Medical Staff Made Aware: Yes  Strengths: Ability to acquire knowledge, Attitude of self, Coping skills, Premorbid level of function, Housing layout  Barriers to Participation: Other (Comment) (none)  End of Session Communication: Bedside nurse (OT)  Assessment Comment: 84 yo female admitted after syncopal fall with hit head/+LOC with decreased activity tolerance, overall fatigue, recent orthostatic hypotension (not during this session), left talus/ankle pain with WB, Right GH joint pain (arthrosis), and decreased activity tolerance. Recommend moderate intensity therapy as pt needs PT & OT, lives alone, and is fall risk.  End of Session Patient Position: Up in chair, Alarm off, not on at start of session   IP OR SWING BED PT PLAN  Inpatient or Swing Bed: Inpatient  PT Plan  Treatment/Interventions: Bed mobility, Transfer training, Gait training, Balance training, Neuromuscular re-education, Endurance training, Strengthening, Range of motion, Therapeutic exercise, Therapeutic activity, Home exercise program, Postural re-education  PT Plan: Skilled PT  PT Frequency: 3 times per week  PT Discharge Recommendations: Moderate intensity level of continued care  Equipment Recommended upon Discharge: Other (comment) (likely no equipment needs)  PT Recommended Transfer Status: Assist x1, Assistive device, Contact guard (WW)  PT - OK to Discharge: Yes (PT eval compleed & DC recs made)      Subjective     General Visit Information:  General  Reason for Referral:  (Admitted 5/21 with  "syncopal episode (passed out in bathroom hit head on sink, +LOC); dx: syncope, fall. Orthostatic hypotension)  Past Medical History Relevant to Rehab: HFrEF, CKD stage IV, HTN, HLD, DM, hypothyroidism, CVA (2017, on Plavix), breast cancer status postchemotherapy (reportedly in remission since 2004), JENELLE  Missed Visit: No  Missed Visit Reason:  (N/A)  Family/Caregiver Present: No  Co-Treatment:  (N/A)  Prior to Session Communication: Bedside nurse  Patient Position Received: Bed, 3 rail up, Alarm off, not on at start of session  Preferred Learning Style: verbal  General Comment: Pt supine, groggy initially but able to wake up and very engaged. Reported Lefet talus pain with gait and even more so to touch and right glenohumeral joint pain with AROM and touch (RN and MD informed).  Home Living:  Home Living  Type of Home: Apartment  Lives With: Alone  Home Adaptive Equipment:  (cane, rollator, shoerchair with back, shower grab bars, handheld shoewr, raised toilet)  Home Layout: One level  Home Access: Elevator  Bathroom Shower/Tub: Tub/shower unit  Bathroom Toilet: Handicapped height  Bathroom Equipment: Grab bars in shower, Shower chair with back, Hand-held shower hose  Prior Level of Function:  Prior Function Per Pt/Caregiver Report  Level of Barren:  (independent ambulation with cane indoors, sometimes rollator outdoors; no stairs; 2 falls in last 12 mos (1 syncopal for this admit))  Receives Help From:  (son drives her to get groceries, talks daily (lives 1 hour away), dtr in area \"but she only has 1 leg\")  ADL Assistance: Independent (ind dress/shower)  Homemaking Assistance: Independent (cook, clean, laundry)  Ambulatory Assistance: Independent  Vocational: Retired  Hand Dominance: Right  Prior Function Comments: doesn't drive; was just starting to do paperwork to get a home health aide  Precautions:  Precautions  Medical Precautions: Fall precautions (yncope, DM, orthostatic hypotension, left ankle pain " and Right GH joint pain)  Vital Signs:  Vital Signs  Heart Rate:  (sitting pre: /83 (no dizziness) HR 67  O2 97%; sitting post: /75 (no dizziness), HR 74 O2 98%)    Objective   Pain:  Pain Assessment  Pain Assessment: 0-10  Pain Score:  (pre: 7/10 headache; during: left ankle pain not rated; post: 8/10 headache, Left talus 10/10 to touch but none at rest, Right GH joint pain with movement (not rated))  Pain Interventions: Ambulation/increased activity, Repositioned, Therapeutic presence, Therapeutic touch (RN informed, getting meds for pt)  Response to Interventions: comfortable up in chair; MD and RN informed about Left ankle and Right shoulder (already xrayed and has arthrosis)  Cognition:  Cognition  Overall Cognitive Status: Within Functional Limits    General Assessments:                  Activity Tolerance  Endurance: Tolerates 10 - 20 min exercise with multiple rests  Activity Tolerance Comments: very fatigued overall after gait, no dizziness    Sensation  Light Touch: Not tested (reports intermittent numbness in feet)       Postural Control  Postural Control: Impaired  Trunk Control: decreased in standing    Static Sitting Balance  Static Sitting-Balance Support: Feet supported, Bilateral upper extremity supported  Static Sitting-Level of Assistance: Modified independent    Static Standing Balance  Static Standing-Balance Support: Bilateral upper extremity supported (on WW)  Static Standing-Level of Assistance: Contact guard (SBA/CGA)  Dynamic Standing Balance  Dynamic Standing-Balance Support: Bilateral upper extremity supported (on WW)  Dynamic Standing-Balance:  (transfers, gait including turns)  Dynamic Standing-Comments: CGA with WW, no LOB  Functional Assessments:  ADL  ADL's Addressed: No    Bed Mobility  Bed Mobility: Yes  Bed Mobility 1  Bed Mobility 1: Supine to sitting  Level of Assistance 1: Minimum assistance, Minimal verbal cues, Minimal tactile cues  Bed Mobility Comments 1: HOB  elevated 50 degrees, use of rail, out on Right side    Transfers  Transfer: Yes  Transfer 1  Transfer From 1: Sit to, Stand to  Transfer to 1: Sit, Stand  Technique 1: Sit to stand, Stand to sit  Transfer Device 1: Walker  Transfer Level of Assistance 1: Minimum assistance, Minimal verbal cues, Minimal tactile cues  Transfers 2  Transfer From 2: Stand to  Transfer to 2: Chair with arms  Technique 2: Stand pivot, Stand to sit  Transfer Device 2: Walker  Transfer Level of Assistance 2: Minimum assistance, Minimal verbal cues    Ambulation/Gait Training  Ambulation/Gait Training Performed: Yes  Ambulation/Gait Training 1  Surface 1: Level tile  Device 1: Rolling walker  Assistance 1: Contact guard, Minimal verbal cues  Quality of Gait 1:  (mild upper trunk flexion, decreased khoa, antalgic left LE WB, walking slightly too far forward intermittently)  Comments/Distance (ft) 1: 80 (gait training focusing on safety and offloading Left foot a little to decrease pain)    Stairs  Stairs: No (N/A)  Extremity/Trunk Assessments:  RUE   RUE : Exceptions to WFL  RUE AROM (degrees)  RUE AROM Comment: grasp and elbow grossly WFL; AAROM Right shoulder elevation WFL, AROM elevation 80 degrees with pain at GH joint  RUE Strength  RUE Overall Strength:  (grasp >4-, elbow flex/ext >4-, shoulder elevation >3- in limited range (pain))  LUE   LUE: Exceptions to WFL  LUE Strength  LUE Overall Strength:  (grasp, elbow flex/ext and shoulder elevation grossly 4/5)  RLE   RLE : Within Functional Limits  LLE   LLE : Within Functional Limits  Treatments:  Ambulation/Gait Training  Ambulation/Gait Training Performed: Yes  Ambulation/Gait Training 1  Surface 1: Level tile  Device 1: Rolling walker  Assistance 1: Contact guard, Minimal verbal cues  Quality of Gait 1:  (mild upper trunk flexion, decreased khoa, antalgic left LE WB, walking slightly too far forward intermittently)  Comments/Distance (ft) 1: 80 (gait training focusing on safety  and offloading Left foot a little to decrease pain)  Outcome Measures:  [facility] Basic Mobility  Turning from your back to your side while in a flat bed without using bedrails: A little  Moving from lying on your back to sitting on the side of a flat bed without using bedrails: A little  Moving to and from bed to chair (including a wheelchair): A little  Standing up from a chair using your arms (e.g. wheelchair or bedside chair): A little  To walk in hospital room: A little  Climbing 3-5 steps with railing: A lot  Basic Mobility - Total Score: 17    Encounter Problems       Encounter Problems (Active)       General Goals       independent with HEP (Not Progressing)       Start:  05/23/24    Expected End:  06/06/24            supine to/from sit (HOB flat, no rail) independently (Progressing)       Start:  05/23/24    Expected End:  06/06/24            sit to/from stand, bed to/from chair with LRAD modified independent, stable vitals (Progressing)       Start:  05/23/24    Expected End:  06/06/24               Mobility       ambulate 200' with LRAD and supervision, no LOB, stable vitals (Progressing)       Start:  05/23/24    Expected End:  06/06/24            static stand with LRAD >7 minutes with supervision, stable vitals, no LOB (Progressing)       Start:  05/23/24    Expected End:  06/06/24                   Education Documentation  Precautions, taught by Raegan Adkins PT at 5/23/2024  8:47 AM.  Learner: Patient  Readiness: Acceptance  Method: Explanation, Demonstration  Response: Demonstrated Understanding, Verbalizes Understanding  Comment: PT purpose/POC/DC recs, vitals, need for assist with all mobility, safe transfers/gait with WW    Body Mechanics, taught by Raegan Adkins PT at 5/23/2024  8:47 AM.  Learner: Patient  Readiness: Acceptance  Method: Explanation, Demonstration  Response: Demonstrated Understanding, Verbalizes Understanding  Comment: PT purpose/POC/DC recs, vitals, need for assist with all  mobility, safe transfers/gait with WW    Mobility Training, taught by Raegan Adkins, PT at 5/23/2024  8:47 AM.  Learner: Patient  Readiness: Acceptance  Method: Explanation, Demonstration  Response: Demonstrated Understanding, Verbalizes Understanding  Comment: PT purpose/POC/DC recs, vitals, need for assist with all mobility, safe transfers/gait with WW    Education Comments  No comments found.

## 2024-05-24 LAB
ANION GAP SERPL CALC-SCNC: 11 MMOL/L (ref 10–20)
BUN SERPL-MCNC: 49 MG/DL (ref 6–23)
CALCIUM SERPL-MCNC: 9.3 MG/DL (ref 8.6–10.6)
CHLORIDE SERPL-SCNC: 100 MMOL/L (ref 98–107)
CO2 SERPL-SCNC: 32 MMOL/L (ref 21–32)
CREAT SERPL-MCNC: 2.2 MG/DL (ref 0.5–1.05)
EGFRCR SERPLBLD CKD-EPI 2021: 22 ML/MIN/1.73M*2
GLUCOSE BLD MANUAL STRIP-MCNC: 175 MG/DL (ref 74–99)
GLUCOSE BLD MANUAL STRIP-MCNC: 176 MG/DL (ref 74–99)
GLUCOSE BLD MANUAL STRIP-MCNC: 286 MG/DL (ref 74–99)
GLUCOSE BLD MANUAL STRIP-MCNC: 90 MG/DL (ref 74–99)
GLUCOSE SERPL-MCNC: 108 MG/DL (ref 74–99)
POTASSIUM SERPL-SCNC: 4.3 MMOL/L (ref 3.5–5.3)
SODIUM SERPL-SCNC: 139 MMOL/L (ref 136–145)

## 2024-05-24 PROCEDURE — 2500000001 HC RX 250 WO HCPCS SELF ADMINISTERED DRUGS (ALT 637 FOR MEDICARE OP)

## 2024-05-24 PROCEDURE — 82947 ASSAY GLUCOSE BLOOD QUANT: CPT

## 2024-05-24 PROCEDURE — 97530 THERAPEUTIC ACTIVITIES: CPT | Mod: GO

## 2024-05-24 PROCEDURE — 99232 SBSQ HOSP IP/OBS MODERATE 35: CPT | Performed by: INTERNAL MEDICINE

## 2024-05-24 PROCEDURE — 82374 ASSAY BLOOD CARBON DIOXIDE: CPT | Performed by: INTERNAL MEDICINE

## 2024-05-24 PROCEDURE — 2500000004 HC RX 250 GENERAL PHARMACY W/ HCPCS (ALT 636 FOR OP/ED)

## 2024-05-24 PROCEDURE — 2500000002 HC RX 250 W HCPCS SELF ADMINISTERED DRUGS (ALT 637 FOR MEDICARE OP, ALT 636 FOR OP/ED)

## 2024-05-24 PROCEDURE — 97165 OT EVAL LOW COMPLEX 30 MIN: CPT | Mod: GO

## 2024-05-24 PROCEDURE — 80048 BASIC METABOLIC PNL TOTAL CA: CPT | Performed by: INTERNAL MEDICINE

## 2024-05-24 PROCEDURE — 1200000002 HC GENERAL ROOM WITH TELEMETRY DAILY

## 2024-05-24 PROCEDURE — 2500000001 HC RX 250 WO HCPCS SELF ADMINISTERED DRUGS (ALT 637 FOR MEDICARE OP): Performed by: INTERNAL MEDICINE

## 2024-05-24 PROCEDURE — 36415 COLL VENOUS BLD VENIPUNCTURE: CPT | Performed by: INTERNAL MEDICINE

## 2024-05-24 RX ORDER — BISACODYL 10 MG/1
10 SUPPOSITORY RECTAL DAILY
Status: DISCONTINUED | OUTPATIENT
Start: 2024-05-24 | End: 2024-05-28 | Stop reason: HOSPADM

## 2024-05-24 RX ADMIN — CARVEDILOL 25 MG: 12.5 TABLET, FILM COATED ORAL at 09:43

## 2024-05-24 RX ADMIN — INSULIN LISPRO 1 UNITS: 100 INJECTION, SOLUTION INTRAVENOUS; SUBCUTANEOUS at 18:23

## 2024-05-24 RX ADMIN — HEPARIN SODIUM 5000 UNITS: 5000 INJECTION INTRAVENOUS; SUBCUTANEOUS at 12:01

## 2024-05-24 RX ADMIN — ACETAMINOPHEN 975 MG: 325 TABLET ORAL at 09:41

## 2024-05-24 RX ADMIN — CLOPIDOGREL BISULFATE 75 MG: 75 TABLET ORAL at 09:43

## 2024-05-24 RX ADMIN — SACUBITRIL AND VALSARTAN 1 TABLET: 24; 26 TABLET, FILM COATED ORAL at 09:43

## 2024-05-24 RX ADMIN — HEPARIN SODIUM 5000 UNITS: 5000 INJECTION INTRAVENOUS; SUBCUTANEOUS at 20:58

## 2024-05-24 RX ADMIN — ATORVASTATIN CALCIUM 20 MG: 20 TABLET, FILM COATED ORAL at 09:43

## 2024-05-24 RX ADMIN — POLYETHYLENE GLYCOL 3350 17 G: 17 POWDER, FOR SOLUTION ORAL at 09:50

## 2024-05-24 RX ADMIN — BISACODYL 10 MG: 10 SUPPOSITORY RECTAL at 12:01

## 2024-05-24 RX ADMIN — CARVEDILOL 25 MG: 12.5 TABLET, FILM COATED ORAL at 18:18

## 2024-05-24 RX ADMIN — LEVOTHYROXINE SODIUM 150 MCG: 0.07 TABLET ORAL at 09:43

## 2024-05-24 RX ADMIN — ACETAMINOPHEN 975 MG: 325 TABLET ORAL at 18:18

## 2024-05-24 ASSESSMENT — COGNITIVE AND FUNCTIONAL STATUS - GENERAL
HELP NEEDED FOR BATHING: A LOT
MOBILITY SCORE: 17
TURNING FROM BACK TO SIDE WHILE IN FLAT BAD: A LITTLE
HELP NEEDED FOR BATHING: A LOT
DRESSING REGULAR LOWER BODY CLOTHING: A LOT
DAILY ACTIVITIY SCORE: 16
MOVING FROM LYING ON BACK TO SITTING ON SIDE OF FLAT BED WITH BEDRAILS: A LITTLE
TOILETING: A LITTLE
DRESSING REGULAR UPPER BODY CLOTHING: A LOT
DRESSING REGULAR UPPER BODY CLOTHING: A LOT
TOILETING: A LITTLE
TURNING FROM BACK TO SIDE WHILE IN FLAT BAD: A LITTLE
DRESSING REGULAR LOWER BODY CLOTHING: A LOT
DAILY ACTIVITIY SCORE: 16
MOVING TO AND FROM BED TO CHAIR: A LITTLE
DRESSING REGULAR LOWER BODY CLOTHING: A LITTLE
TOILETING: A LITTLE
CLIMB 3 TO 5 STEPS WITH RAILING: A LOT
WALKING IN HOSPITAL ROOM: A LITTLE
MOVING TO AND FROM BED TO CHAIR: A LITTLE
CLIMB 3 TO 5 STEPS WITH RAILING: A LOT
DRESSING REGULAR UPPER BODY CLOTHING: A LITTLE
STANDING UP FROM CHAIR USING ARMS: A LITTLE
DAILY ACTIVITIY SCORE: 20
PERSONAL GROOMING: A LITTLE
STANDING UP FROM CHAIR USING ARMS: A LITTLE
DRESSING REGULAR LOWER BODY CLOTHING: A LOT
MOVING FROM LYING ON BACK TO SITTING ON SIDE OF FLAT BED WITH BEDRAILS: A LITTLE
DAILY ACTIVITIY SCORE: 16
MOBILITY SCORE: 17
TOILETING: A LITTLE
DRESSING REGULAR UPPER BODY CLOTHING: A LOT
MOVING TO AND FROM BED TO CHAIR: A LITTLE
CLIMB 3 TO 5 STEPS WITH RAILING: A LOT
PERSONAL GROOMING: A LITTLE
MOVING FROM LYING ON BACK TO SITTING ON SIDE OF FLAT BED WITH BEDRAILS: A LITTLE
TURNING FROM BACK TO SIDE WHILE IN FLAT BAD: A LITTLE
HELP NEEDED FOR BATHING: A LITTLE
HELP NEEDED FOR BATHING: A LOT
STANDING UP FROM CHAIR USING ARMS: A LITTLE
MOBILITY SCORE: 17
WALKING IN HOSPITAL ROOM: A LITTLE
WALKING IN HOSPITAL ROOM: A LITTLE
PERSONAL GROOMING: A LITTLE

## 2024-05-24 ASSESSMENT — PAIN SCALES - GENERAL
PAINLEVEL_OUTOF10: 0 - NO PAIN
PAINLEVEL_OUTOF10: 7
PAINLEVEL_OUTOF10: 7
PAINLEVEL_OUTOF10: 3

## 2024-05-24 ASSESSMENT — PAIN - FUNCTIONAL ASSESSMENT
PAIN_FUNCTIONAL_ASSESSMENT: 0-10

## 2024-05-24 ASSESSMENT — ACTIVITIES OF DAILY LIVING (ADL)
ADL_ASSISTANCE: INDEPENDENT
BATHING_ASSISTANCE: MODERATE

## 2024-05-24 ASSESSMENT — PAIN DESCRIPTION - LOCATION: LOCATION: SHOULDER

## 2024-05-24 NOTE — PROGRESS NOTES
"Kristy Sanchez is a 83 y.o. female on day 3 of admission presenting with Fall, initial encounter.    Subjective     No events overnight.   Reports no further nausea,   Feels dizzy while standing,   Also still reporting constipation.     RN reported postural drop in BP this am,        Objective     Physical Exam  Constitutional:       Comments: Elderly lady, alert and oriented x 3  Comfortable at rest    HENT:      Head: Normocephalic.      Nose: Nose normal.   Eyes:      Extraocular Movements: Extraocular movements intact.      Pupils: Pupils are equal, round, and reactive to light.   Cardiovascular:      Rate and Rhythm: Normal rate and regular rhythm.      Heart sounds: Normal heart sounds. No murmur heard.  Pulmonary:      Effort: Pulmonary effort is normal. No respiratory distress.      Breath sounds: Normal breath sounds. No wheezing.   Abdominal:      General: There is no distension.      Palpations: Abdomen is soft.      Tenderness: There is no abdominal tenderness.   Musculoskeletal:         General: Normal range of motion.      Cervical back: Normal range of motion.   Skin:     General: Skin is warm.   Neurological:      General: No focal deficit present.      Mental Status: She is oriented to person, place, and time. Mental status is at baseline.   Psychiatric:         Mood and Affect: Mood normal.         Last Recorded Vitals  Blood pressure 144/81, pulse 60, temperature 36.4 °C (97.5 °F), temperature source Temporal, resp. rate 18, height 1.676 m (5' 6\"), weight 81.6 kg (180 lb), SpO2 97%.  Intake/Output last 3 Shifts:  I/O last 3 completed shifts:  In: - (0 mL/kg)   Out: 700 (8.6 mL/kg) [Urine:700 (0.2 mL/kg/hr)]  Weight: 81.6 kg     Relevant Results  Scheduled medications  acetaminophen, 975 mg, oral, q8h  allopurinol, 50 mg, oral, q48h  atorvastatin, 20 mg, oral, Daily  bisacodyl, 10 mg, rectal, Daily  carvedilol, 25 mg, oral, BID  clopidogrel, 75 mg, oral, Daily  [Held by provider] empagliflozin, 10 " mg, oral, Daily  [Held by provider] furosemide, 40 mg, oral, Daily  heparin (porcine), 5,000 Units, subcutaneous, q8h  [Held by provider] hydrALAZINE, 50 mg, oral, BID  insulin lispro, 0-5 Units, subcutaneous, TID  levothyroxine, 150 mcg, oral, Daily  polyethylene glycol, 17 g, oral, Daily  [Held by provider] sacubitriL-valsartan, 1 tablet, oral, BID  [Held by provider] spironolactone, 12.5 mg, oral, Daily      Continuous medications     PRN medications  PRN medications: dextrose, dextrose, glucagon, glucagon, ondansetron    Results for orders placed or performed during the hospital encounter of 05/21/24 (from the past 24 hour(s))   POCT GLUCOSE   Result Value Ref Range    POCT Glucose 172 (H) 74 - 99 mg/dL   POCT GLUCOSE   Result Value Ref Range    POCT Glucose 153 (H) 74 - 99 mg/dL   POCT GLUCOSE   Result Value Ref Range    POCT Glucose 90 74 - 99 mg/dL   Basic metabolic panel   Result Value Ref Range    Glucose 108 (H) 74 - 99 mg/dL    Sodium 139 136 - 145 mmol/L    Potassium 4.3 3.5 - 5.3 mmol/L    Chloride 100 98 - 107 mmol/L    Bicarbonate 32 21 - 32 mmol/L    Anion Gap 11 10 - 20 mmol/L    Urea Nitrogen 49 (H) 6 - 23 mg/dL    Creatinine 2.20 (H) 0.50 - 1.05 mg/dL    eGFR 22 (L) >60 mL/min/1.73m*2    Calcium 9.3 8.6 - 10.6 mg/dL   POCT GLUCOSE   Result Value Ref Range    POCT Glucose 175 (H) 74 - 99 mg/dL         Assessment/Plan   Principal Problem:    Fall, initial encounter  Active Problems:    Syncope    Kristy Sanchez is an 83 year old lady with PMH significant for HFrEF (45%), JENELLE (on CPAP), CKD 4, HTN, HLD, T2DM, Hypothyroidism, gout, Hx CVA (2017 on clopidogrel), Hx breast cancer s/p surgery and chemo (2004, reportedly in remission) who is presented to [facility] ED on 5/21 for Dizziness and fall. She is positive for orthostatic drop in blood pressure.     #Dizziness, Fall:  -History most c/w orthostasis, (potentially iso uptitration of GDMT and overdiuresis)  -Imaging in ED including CT head/C spine without  fracture  -s/p 1L LR in ED   -Holding diuretics,   Orthostatics still positive on 5/24  DC Entresto,   Monitor,     #HTN  #HFrEF  -EF 45% at OSH (5/15)  Continue  carvedilol 25 mg BID,   Hold  Entresto due to postural hypotension,   Holding jardiance 10,   Hold hydralazine 50 BID, jr 12.5, Lasix 40,   Continue atorvastatin 20       #SHAHAB on CKD IV  -Recent SCr 1.71 (5/16 per care everywhere at [facility]),elevated  to 2.32 in ED  -Recently had Lasix increased to 40 daily and was started on entresto 49-51 BID at [facility],   reportedly followed instructions for Lisinopril washout, was also started on spironolactone on discharge.  Monitor   -Holding spironolactone and empagliflozin iso SHAHAB  Creatinine is stable at 2.0    Left ankle pain:  X-ray showed no fractures,         #Hypothyroidism  -On levothyroxine 150 mcg daily, last T[facility] 14.3 ([facility])    #Hx CVA  -On clopidogrel 75 daily     #Gout  -Continue home allopurinol 50 every other day     #T2DM  - last HBA1C 6.7 (10/2023)  -On empagliflozin for cotreatment with HFrEF  -holding empagliflozin  -SSI while inpatient     F: PRN  E: PRn  N: Cardiac  A: PIV     DVT ppx: heparin  GI ppx: not indicated    Code Status: DNR/DNI (confirmed on admission)  Surrogate Medical Decision-maker: Mike (son) [phone]    PT/ OT advised moderate intensity, she refused SNF.     Dispo: Optimizing HF rx , and adjusting meds for orthostatic hypotension. Anticipate discharge 1 to 2 days.     Octavio Coffey MD

## 2024-05-24 NOTE — PROGRESS NOTES
"Occupational Therapy    Evaluation and Treatment    Patient Name: Kristy Sanchez  MRN: [id number]  Today's Date: 5/24/2024  Room: [address] [place], [place] [address]  Time Calculation  Start Time: 1452  Stop Time: 1529  Time Calculation (min): 37 min    Assessment  IP OT Assessment  OT Assessment: Pt presents with symptomatic orthostatic hypotension limiting her ability to perform upright activities including ADL/IADLs, functional mobility, balance, and ambulation. Pt tearful during session stating \"I am not usually like this, I don't like this at all\". Pt provided with coping strategies and was receptive. Overall, patient was unable to tolerate upright activities 2/2 orthostatic hypotension and would likely benefit from skilled OT services at a MOD intensity although pt likely to deny MOD and would therfore benefit from skilled OT services at a LOW intensity to address noted defecits.  Prognosis: Good  Barriers to Discharge: Decreased caregiver support  Evaluation/Treatment Tolerance: Patient limited by fatigue  Medical Staff Made Aware: Yes  End of Session Communication: Bedside nurse, PCT/NA/CTA  End of Session Patient Position: Up in chair, Alarm off, not on at start of session  Plan:  Treatment Interventions: ADL retraining, Functional transfer training, UE strengthening/ROM, Endurance training, Equipment evaluation/education, Compensatory technique education  OT Frequency: 3 times per week  OT Discharge Recommendations: Moderate intensity level of continued care, Low intensity level of continued care  OT Recommended Transfer Status: Assist of 1  OT - OK to Discharge: Yes    Subjective   Current Problem:  1. Fall, initial encounter        2. Syncope, unspecified syncope type          General:  Reason for Referral: Admitted 5/21 with syncopal episode (passed out in bathroom hit head on sink, +LOC); dx: syncope, fall. Orthostatic hypotension  Past Medical History Relevant to Rehab: HFrEF, CKD stage IV, HTN, HLD, DM, hypothyroidism, CVA " "(2017, on Plavix), breast cancer status postchemotherapy (reportedly in remission since 2004), JENELLE  Prior to Session Communication: Bedside nurse  Patient Position Received: Up in chair, Alarm off, not on at start of session  Family/Caregiver Present: No  General Comment: Pt sitting up in chair upon arrival. Pt pleasant and agreeable to OT eval. Pt became orthostatic upon standing and had to sit shortly after- pt became tearful and this OT provided coping strategies and emotional support.   Precautions:  Medical Precautions: Fall precautions (syncope, DM, orthostatic hypotension, left ankle pain and Right GH joint pain)  Vital Signs:   Heart Rate: 60  SpO2: 97 %  BP: 144/81 (Unable to attain upright BP as pt became orthostatic and needed to sit)  Patient Position: Sitting  Pain:  Pain Assessment  Pain Assessment: 0-10  Pain Score: 7  Pain Type: Acute pain  Pain Location: Shoulder  Pain Orientation: Right  Pain Interventions: Repositioned, Relaxation technique  Response to Interventions: Unchanged    Objective   Cognition:  Overall Cognitive Status: Within Functional Limits  Orientation Level: Oriented X4    Home Living:  Type of Home: Apartment  Lives With: Alone  Home Adaptive Equipment: Cane, Other (Comment) (rollator)  Home Layout: Able to live on main level with bedroom/bathroom, One level  Home Access: Elevator  Bathroom Shower/Tub: Tub/shower unit  Bathroom Toilet: Handicapped height  Bathroom Equipment: Grab bars in shower, Shower chair with back, Hand-held shower hose   Prior Function:  Level of [place]: Independent with ADLs and functional transfers, Independent with homemaking with ambulation  Receives Help From: Family (son drives her to get groceries, talks daily (lives 1 hour away), dtr in area \"but she only has 1 leg\")  ADL Assistance: Independent  Homemaking Assistance: Independent  Ambulatory Assistance: Independent  Hand Dominance: Right  IADL History:  Homemaking Responsibilities: " Yes  Homemaking Comments: Primary homemaker  Current License: No  Mode of Transportation: Family, Ride share  Occupation: Retired  Leisure and Hobbies: Crossword puzzles  ADL:  Eating Assistance: Independent (Anticipated)  Grooming Assistance: Stand by (Anticipated)  Grooming Deficit: Setup  Bathing Assistance: Moderate (Anticipated)  UE Dressing Assistance: Moderate (Anticipated)  UE Dressing Deficit: Thread RUE, Increased time to complete  LE Dressing Assistance: Moderate (Anticipated)  Toileting Assistance with Device: Minimal (Anticipated)  Activity Tolerance:  Endurance: Decreased tolerance for upright activites (2/2 symptomatic orthostatic hypotension)  Balance:  Dynamic Sitting Balance  Dynamic Sitting-Comments: SBA for safety  Static Sitting Balance  Static Sitting-Comment/Number of Minutes: SBA  Static Standing Balance  Static Standing-Balance Support: Bilateral upper extremity supported  Static Standing-Comment/Number of Minutes: MinAx1-CGA with FWW  Bed Mobility/Transfers: Bed Mobility/Transfers: Bed Mobility  Bed Mobility: No  Functional Mobility  Functional Mobility Performed: No   and Transfers  Transfer: Yes  Transfer 1  Transfer From 1: Sit to, Stand to  Transfer to 1: Sit, Stand  Technique 1: Sit to stand, Stand to sit  Transfer Device 1: Walker  Transfer Level of Assistance 1: Minimum assistance, Minimal verbal cues  Trials/Comments 1: x1 trials with orthostatic hypotension limiting upright mobility  IADL's:   Homemaking Responsibilities: Yes  Homemaking Comments: Primary homemaker  Current License: No  Mode of Transportation: Family, Ride [place]  Occupation: Retired  Leisure and Hobbies: [employer]  Vision: Vision - Basic Assessment  Current Vision: Wears glasses all the time   and Vision - Complex Assessment  Ocular Range of Motion: Within Functional Limits  Sensation:  Light Touch: No apparent deficits (In BUE- numbness reported in feet and up into legs)  Strength:  Strength Comments: BUE  WFL other than R shoulder (3-/5 @ ~ 110 degrees AROM, PROM to ~140 degrees with pain at end range)  Perception:  Inattention/Neglect: Appears intact  Coordination:  Movements are Fluid and Coordinated: Yes  Finger to Nose: Intact   Hand Function:  Hand Function  Gross Grasp: Functional  Coordination: Functional  Extremities:   RUE   RUE : Exceptions to WFL (RUE WFL other than R shoulder (3-/5 @ ~ 110 degrees AROM, PROM to ~140 degrees with pain at end range)), LUE   LUE: Within Functional Limits      Outcome Measures: [facility] Daily Activity  Putting on and taking off regular lower body clothing: A lot  Bathing (including washing, rinsing, drying): A lot  Putting on and taking off regular upper body clothing: A lot  Toileting, which includes using toilet, bedpan or urinal: A little  Taking care of personal grooming such as brushing teeth: A little  Eating Meals: None  Daily Activity - Total Score: 16    OT Adult Other Outcome Measures  4AT: -    Education Documentation  Handouts, taught by Jone Wade OT at 5/24/2024  4:21 PM.  Learner: Patient  Readiness: Acceptance  Method: Explanation  Response: Verbalizes Understanding  Comment: Pt provided OT psychosocial handout and was educated on coping strategies and other psychosocial health information    Body Mechanics, taught by Jone Wade OT at 5/24/2024  4:21 PM.  Learner: Patient  Readiness: Acceptance  Method: Explanation, Demonstration  Response: Verbalizes Understanding, Demonstrated Understanding    Precautions, taught by Jone Wade OT at 5/24/2024  4:21 PM.  Learner: Patient  Readiness: Acceptance  Method: Explanation, Demonstration  Response: Verbalizes Understanding, Demonstrated Understanding    ADL Training, taught by Jone Wade OT at 5/24/2024  4:21 PM.  Learner: Patient  Readiness: Acceptance  Method: Explanation, Demonstration  Response: Verbalizes Understanding, Demonstrated Understanding    Education Comments  No  comments found.    Goals:   Encounter Problems       Encounter Problems (Active)       ADLs       Pt will complete UB /LB bathing tasks with modified independence while seated and AE as needed.        Start:  05/24/24    Expected End:  06/07/24            Pt will complete UB dressing with modified independent level of assistance while seated and/or standing.         Start:  05/24/24    Expected End:  06/07/24            Pt will complete LB dressing with modified independence while seated and/or standing and AE as needed.        Start:  05/24/24    Expected End:  06/07/24               BALANCE       Pt will maintain dynamic standing balance during ADL task with modified independent level of assistance in order to demonstrate decreased risk of falling and improved postural control.       Start:  05/24/24    Expected End:  06/07/24               EXERCISE/STRENGTHENING       Patient will be educated on RUE HEP for increased ADL performance.       Start:  05/24/24    Expected End:  06/07/24               MOBILITY       Patient will perform Functional mobility max Household distances/Community Distances with modified independent level of assistance and least restrictive device in order to improve safety and functional mobility.       Start:  05/24/24    Expected End:  06/07/24               TRANSFERS       Patient will complete sit to stand transfer with modified independent level of assistance and least restrictive device in order to improve safety and prepare for out of bed mobility.       Start:  05/24/24    Expected End:  06/07/24                   Treatment Completed on Evaluation    Therapy/Activity:     Therapeutic Activity  Therapeutic Activity Performed: Yes  Therapeutic Activity 1: Pt provided psychosocial OT packet with education on coping strategies. Pt and OT had in-depth education session on coping strategies previously used and new strategies and the implementation of strategies in hospital  setting.    05/24/24 at 4:25 PM   Jone Wade, OT   Rehab Office: [phone]

## 2024-05-24 NOTE — NURSING NOTE
END OF SHIFT NOTE     Patient remained safe and free from falls during the shift.  Patient complained of Right shoulder pain,  Tylenol administered.  Patient participated with PT/OT today and is sitting up sitting in a chair.  Patient has an order to have BP checked BID.  Unable to get patient orthostats while standing patient complained of dizziness Dr. Erlinda thomas. MD is holding Entresto.  Pt. has been cooperative and pleasant.   Call light within reach.  No other events occurred throughout shift.   Oncoming Nurse will continue to monitor patient.     Dian Stout LPN

## 2024-05-25 ENCOUNTER — APPOINTMENT (OUTPATIENT)
Dept: RADIOLOGY | Facility: HOSPITAL | Age: 84
DRG: 312 | End: 2024-05-25
Payer: COMMERCIAL

## 2024-05-25 LAB
ALBUMIN SERPL BCP-MCNC: 3.5 G/DL (ref 3.4–5)
ALP SERPL-CCNC: 52 U/L (ref 33–136)
ALT SERPL W P-5'-P-CCNC: 7 U/L (ref 7–45)
ANION GAP SERPL CALC-SCNC: 13 MMOL/L (ref 10–20)
AST SERPL W P-5'-P-CCNC: 13 U/L (ref 9–39)
BILIRUB SERPL-MCNC: 0.4 MG/DL (ref 0–1.2)
BUN SERPL-MCNC: 46 MG/DL (ref 6–23)
CALCIUM SERPL-MCNC: 9.3 MG/DL (ref 8.6–10.6)
CHLORIDE SERPL-SCNC: 99 MMOL/L (ref 98–107)
CO2 SERPL-SCNC: 28 MMOL/L (ref 21–32)
CREAT SERPL-MCNC: 1.92 MG/DL (ref 0.5–1.05)
EGFRCR SERPLBLD CKD-EPI 2021: 25 ML/MIN/1.73M*2
ERYTHROCYTE [DISTWIDTH] IN BLOOD BY AUTOMATED COUNT: 15.1 % (ref 11.5–14.5)
GLUCOSE BLD MANUAL STRIP-MCNC: 110 MG/DL (ref 74–99)
GLUCOSE BLD MANUAL STRIP-MCNC: 169 MG/DL (ref 74–99)
GLUCOSE BLD MANUAL STRIP-MCNC: 262 MG/DL (ref 74–99)
GLUCOSE SERPL-MCNC: 192 MG/DL (ref 74–99)
HCT VFR BLD AUTO: 38 % (ref 36–46)
HGB BLD-MCNC: 12.4 G/DL (ref 12–16)
MCH RBC QN AUTO: 32.2 PG (ref 26–34)
MCHC RBC AUTO-ENTMCNC: 32.6 G/DL (ref 32–36)
MCV RBC AUTO: 99 FL (ref 80–100)
NRBC BLD-RTO: 0 /100 WBCS (ref 0–0)
PLATELET # BLD AUTO: 231 X10*3/UL (ref 150–450)
POTASSIUM SERPL-SCNC: 4.3 MMOL/L (ref 3.5–5.3)
PROT SERPL-MCNC: 6.7 G/DL (ref 6.4–8.2)
RBC # BLD AUTO: 3.85 X10*6/UL (ref 4–5.2)
SODIUM SERPL-SCNC: 136 MMOL/L (ref 136–145)
WBC # BLD AUTO: 6.9 X10*3/UL (ref 4.4–11.3)

## 2024-05-25 PROCEDURE — 2500000002 HC RX 250 W HCPCS SELF ADMINISTERED DRUGS (ALT 637 FOR MEDICARE OP, ALT 636 FOR OP/ED)

## 2024-05-25 PROCEDURE — 2500000001 HC RX 250 WO HCPCS SELF ADMINISTERED DRUGS (ALT 637 FOR MEDICARE OP): Performed by: INTERNAL MEDICINE

## 2024-05-25 PROCEDURE — 80053 COMPREHEN METABOLIC PANEL: CPT | Performed by: INTERNAL MEDICINE

## 2024-05-25 PROCEDURE — 36415 COLL VENOUS BLD VENIPUNCTURE: CPT | Performed by: INTERNAL MEDICINE

## 2024-05-25 PROCEDURE — 82947 ASSAY GLUCOSE BLOOD QUANT: CPT

## 2024-05-25 PROCEDURE — 1200000002 HC GENERAL ROOM WITH TELEMETRY DAILY

## 2024-05-25 PROCEDURE — 99232 SBSQ HOSP IP/OBS MODERATE 35: CPT | Performed by: INTERNAL MEDICINE

## 2024-05-25 PROCEDURE — 71046 X-RAY EXAM CHEST 2 VIEWS: CPT

## 2024-05-25 PROCEDURE — 2500000004 HC RX 250 GENERAL PHARMACY W/ HCPCS (ALT 636 FOR OP/ED): Performed by: INTERNAL MEDICINE

## 2024-05-25 PROCEDURE — 71046 X-RAY EXAM CHEST 2 VIEWS: CPT | Performed by: RADIOLOGY

## 2024-05-25 PROCEDURE — 2500000001 HC RX 250 WO HCPCS SELF ADMINISTERED DRUGS (ALT 637 FOR MEDICARE OP)

## 2024-05-25 PROCEDURE — 2500000004 HC RX 250 GENERAL PHARMACY W/ HCPCS (ALT 636 FOR OP/ED)

## 2024-05-25 PROCEDURE — 85027 COMPLETE CBC AUTOMATED: CPT | Performed by: INTERNAL MEDICINE

## 2024-05-25 RX ORDER — LISINOPRIL 10 MG/1
20 TABLET ORAL DAILY
Status: DISCONTINUED | OUTPATIENT
Start: 2024-05-25 | End: 2024-05-28 | Stop reason: HOSPADM

## 2024-05-25 RX ORDER — CARVEDILOL 12.5 MG/1
12.5 TABLET ORAL
Status: DISCONTINUED | OUTPATIENT
Start: 2024-05-25 | End: 2024-05-28 | Stop reason: HOSPADM

## 2024-05-25 RX ADMIN — HEPARIN SODIUM 5000 UNITS: 5000 INJECTION INTRAVENOUS; SUBCUTANEOUS at 20:55

## 2024-05-25 RX ADMIN — POLYETHYLENE GLYCOL 3350 17 G: 17 POWDER, FOR SOLUTION ORAL at 09:59

## 2024-05-25 RX ADMIN — CARVEDILOL 12.5 MG: 12.5 TABLET, FILM COATED ORAL at 17:19

## 2024-05-25 RX ADMIN — ACETAMINOPHEN 975 MG: 325 TABLET ORAL at 02:11

## 2024-05-25 RX ADMIN — HEPARIN SODIUM 5000 UNITS: 5000 INJECTION INTRAVENOUS; SUBCUTANEOUS at 04:23

## 2024-05-25 RX ADMIN — INSULIN LISPRO 3 UNITS: 100 INJECTION, SOLUTION INTRAVENOUS; SUBCUTANEOUS at 17:38

## 2024-05-25 RX ADMIN — ALLOPURINOL 50 MG: 100 TABLET ORAL at 09:59

## 2024-05-25 RX ADMIN — LEVOTHYROXINE SODIUM 150 MCG: 0.07 TABLET ORAL at 06:30

## 2024-05-25 RX ADMIN — LISINOPRIL 20 MG: 10 TABLET ORAL at 11:34

## 2024-05-25 RX ADMIN — CARVEDILOL 12.5 MG: 12.5 TABLET, FILM COATED ORAL at 10:20

## 2024-05-25 RX ADMIN — ACETAMINOPHEN 975 MG: 325 TABLET ORAL at 17:39

## 2024-05-25 RX ADMIN — HEPARIN SODIUM 5000 UNITS: 5000 INJECTION INTRAVENOUS; SUBCUTANEOUS at 11:47

## 2024-05-25 RX ADMIN — ACETAMINOPHEN 975 MG: 325 TABLET ORAL at 10:19

## 2024-05-25 RX ADMIN — CLOPIDOGREL BISULFATE 75 MG: 75 TABLET ORAL at 10:20

## 2024-05-25 RX ADMIN — ATORVASTATIN CALCIUM 20 MG: 20 TABLET, FILM COATED ORAL at 09:55

## 2024-05-25 RX ADMIN — SODIUM CHLORIDE, POTASSIUM CHLORIDE, SODIUM LACTATE AND CALCIUM CHLORIDE 500 ML: 600; 310; 30; 20 INJECTION, SOLUTION INTRAVENOUS at 17:19

## 2024-05-25 ASSESSMENT — COGNITIVE AND FUNCTIONAL STATUS - GENERAL
STANDING UP FROM CHAIR USING ARMS: A LITTLE
MOBILITY SCORE: 17
HELP NEEDED FOR BATHING: A LITTLE
DRESSING REGULAR UPPER BODY CLOTHING: A LITTLE
CLIMB 3 TO 5 STEPS WITH RAILING: A LOT
MOVING FROM LYING ON BACK TO SITTING ON SIDE OF FLAT BED WITH BEDRAILS: A LITTLE
TURNING FROM BACK TO SIDE WHILE IN FLAT BAD: A LITTLE
DRESSING REGULAR LOWER BODY CLOTHING: A LITTLE
HELP NEEDED FOR BATHING: A LITTLE
TOILETING: A LITTLE
DRESSING REGULAR UPPER BODY CLOTHING: A LITTLE
MOVING TO AND FROM BED TO CHAIR: A LITTLE
TURNING FROM BACK TO SIDE WHILE IN FLAT BAD: A LITTLE
MOBILITY SCORE: 17
WALKING IN HOSPITAL ROOM: A LITTLE
MOVING FROM LYING ON BACK TO SITTING ON SIDE OF FLAT BED WITH BEDRAILS: A LITTLE
TOILETING: A LITTLE
WALKING IN HOSPITAL ROOM: A LITTLE
DAILY ACTIVITIY SCORE: 20
CLIMB 3 TO 5 STEPS WITH RAILING: A LOT
DRESSING REGULAR LOWER BODY CLOTHING: A LITTLE
MOVING TO AND FROM BED TO CHAIR: A LITTLE
STANDING UP FROM CHAIR USING ARMS: A LITTLE
DAILY ACTIVITIY SCORE: 20

## 2024-05-25 ASSESSMENT — PAIN - FUNCTIONAL ASSESSMENT
PAIN_FUNCTIONAL_ASSESSMENT: 0-10

## 2024-05-25 ASSESSMENT — PAIN SCALES - GENERAL
PAINLEVEL_OUTOF10: 0 - NO PAIN
PAINLEVEL_OUTOF10: 8
PAINLEVEL_OUTOF10: 2
PAINLEVEL_OUTOF10: 4
PAINLEVEL_OUTOF10: 6

## 2024-05-25 ASSESSMENT — PAIN DESCRIPTION - LOCATION: LOCATION: SHOULDER

## 2024-05-25 ASSESSMENT — PAIN DESCRIPTION - ORIENTATION: ORIENTATION: RIGHT

## 2024-05-25 NOTE — NURSING NOTE
END OF SHIFT NOTE     Patient remained safe and free from falls during the shift.  Patient complained of Right Shoulder Pain.  Tylenol administered.  Orthostatic completed this morning patient was able to stay standing without becoming lightheaded.  Patient has been ambulating to bathroom with a walker and stand by assistance.   Patient stated she had a small bowel movement yesterday refused suppository this morning but did drink Mirlax.  Dr. Coffey put in order for 500ml Bolus of LR going @ 250ml/hr after last set of Ortho's.  Pt. has been cooperative and pleasant.   Call light within reach.  No other events occurred throughout shift.   Oncoming Nurse will continue to monitor patient.     Dian Stout LPN

## 2024-05-25 NOTE — PROGRESS NOTES
"Kristy Sanchez is a 83 y.o. female on day 4 of admission presenting with Fall, initial encounter.    Subjective     No events overnight.   Reports no further nausea,   This am, she is able to walk, no further dizziness.     RN reported no new events,        Objective     Physical Exam  Constitutional:       Comments: Elderly lady, alert and oriented x 3  Comfortable at rest    HENT:      Head: Normocephalic.      Nose: Nose normal.   Eyes:      Extraocular Movements: Extraocular movements intact.      Pupils: Pupils are equal, round, and reactive to light.   Cardiovascular:      Rate and Rhythm: Normal rate and regular rhythm.      Heart sounds: Normal heart sounds. No murmur heard.  Pulmonary:      Effort: Pulmonary effort is normal. No respiratory distress.      Breath sounds: Normal breath sounds. No wheezing.   Abdominal:      General: There is no distension.      Palpations: Abdomen is soft.      Tenderness: There is no abdominal tenderness.   Musculoskeletal:         General: Normal range of motion.      Cervical back: Normal range of motion.   Skin:     General: Skin is warm.   Neurological:      General: No focal deficit present.      Mental Status: She is oriented to person, place, and time. Mental status is at baseline.   Psychiatric:         Mood and Affect: Mood normal.         Last Recorded Vitals  Blood pressure 138/70, pulse 68, temperature 36.5 °C (97.7 °F), temperature source Temporal, resp. rate 18, height 1.676 m (5' 6\"), weight 81.6 kg (180 lb), SpO2 97%.  Intake/Output last 3 Shifts:  I/O last 3 completed shifts:  In: 360 (4.4 mL/kg) [P.O.:360]  Out: 1200 (14.7 mL/kg) [Urine:1200 (0.4 mL/kg/hr)]  Weight: 81.6 kg     Relevant Results  Scheduled medications  acetaminophen, 975 mg, oral, q8h  allopurinol, 50 mg, oral, q48h  atorvastatin, 20 mg, oral, Daily  bisacodyl, 10 mg, rectal, Daily  carvedilol, 12.5 mg, oral, BID  clopidogrel, 75 mg, oral, Daily  [Held by provider] empagliflozin, 10 mg, oral, " Daily  [Held by provider] furosemide, 40 mg, oral, Daily  heparin (porcine), 5,000 Units, subcutaneous, q8h  [Held by provider] hydrALAZINE, 50 mg, oral, BID  insulin lispro, 0-5 Units, subcutaneous, TID  levothyroxine, 150 mcg, oral, Daily  lisinopril, 20 mg, oral, Daily  polyethylene glycol, 17 g, oral, Daily  [Held by provider] spironolactone, 12.5 mg, oral, Daily      Continuous medications     PRN medications  PRN medications: dextrose, dextrose, glucagon, glucagon, ondansetron    Results for orders placed or performed during the hospital encounter of 05/21/24 (from the past 24 hour(s))   POCT GLUCOSE   Result Value Ref Range    POCT Glucose 176 (H) 74 - 99 mg/dL   POCT GLUCOSE   Result Value Ref Range    POCT Glucose 286 (H) 74 - 99 mg/dL   POCT GLUCOSE   Result Value Ref Range    POCT Glucose 110 (H) 74 - 99 mg/dL   CBC   Result Value Ref Range    WBC 6.9 4.4 - 11.3 x10*3/uL    nRBC 0.0 0.0 - 0.0 /100 WBCs    RBC 3.85 (L) 4.00 - 5.20 x10*6/uL    Hemoglobin 12.4 12.0 - 16.0 g/dL    Hematocrit 38.0 36.0 - 46.0 %    MCV 99 80 - 100 fL    MCH 32.2 26.0 - 34.0 pg    MCHC 32.6 32.0 - 36.0 g/dL    RDW 15.1 (H) 11.5 - 14.5 %    Platelets 231 150 - 450 x10*3/uL   Comprehensive Metabolic Panel   Result Value Ref Range    Glucose 192 (H) 74 - 99 mg/dL    Sodium 136 136 - 145 mmol/L    Potassium 4.3 3.5 - 5.3 mmol/L    Chloride 99 98 - 107 mmol/L    Bicarbonate 28 21 - 32 mmol/L    Anion Gap 13 10 - 20 mmol/L    Urea Nitrogen 46 (H) 6 - 23 mg/dL    Creatinine 1.92 (H) 0.50 - 1.05 mg/dL    eGFR 25 (L) >60 mL/min/1.73m*2    Calcium 9.3 8.6 - 10.6 mg/dL    Albumin 3.5 3.4 - 5.0 g/dL    Alkaline Phosphatase 52 33 - 136 U/L    Total Protein 6.7 6.4 - 8.2 g/dL    AST 13 9 - 39 U/L    Bilirubin, Total 0.4 0.0 - 1.2 mg/dL    ALT 7 7 - 45 U/L   POCT GLUCOSE   Result Value Ref Range    POCT Glucose 169 (H) 74 - 99 mg/dL         Assessment/Plan   Principal Problem:    Fall, initial encounter  Active Problems:    Syncope    Kristy  Laura is an 83 year old lady with PMH significant for HFrEF (45%), JENELLE (on CPAP), CKD 4, HTN, HLD, T2DM, Hypothyroidism, gout, Hx CVA (2017 on clopidogrel), Hx breast cancer s/p surgery and chemo (2004, reportedly in remission) who is presented to [facility] ED on 5/21 for Dizziness and fall. She is positive for orthostatic drop in blood pressure.     #Dizziness, Fall:  -History most c/w orthostasis, (potentially iso uptitration of GDMT and overdiuresis)  -Imaging in ED including CT head/C spine without fracture  -s/p 1L LR in ED   -Holding diuretics,   Orthostatics positive on 5/24  Entresto is discontinued,   Monitor,     #HTN  #HFrEF  -EF 45% at OSH (5/15)  Change carvedilol to 12.5 mg BID,     Entresto discontinued due to postural hypotension even with lower dose.    Holding jardiance 10,   Hold hydralazine 50 BID, jr 12.5, Lasix 40,   Continue atorvastatin 20  Start Lisinopril 20 mg daily  Continue to monitor orthostatics.        #SHAHAB on CKD IV  -Recent SCr 1.71 (5/16 per care everywhere at [facility]),elevated  to 2.32 in ED  -Recently had Lasix increased to 40 daily and was started on entresto 49-51 BID at [facility],   reportedly followed instructions for Lisinopril washout, was also started on spironolactone on discharge.  Monitor   -Holding spironolactone and empagliflozin iso SHAHAB  Creatinine is 1.9 mg today.     Left ankle pain:  X-ray showed no fractures,         #Hypothyroidism  -On levothyroxine 150 mcg daily, last T[facility] 14.3 ([facility])    #Hx CVA  -On clopidogrel 75 daily     #Gout  -Continue home allopurinol 50 every other day     #T2DM  - last HBA1C 6.7 (10/2023)  -On empagliflozin for cotreatment with HFrEF  -holding empagliflozin  -SSI while inpatient     F: PRN  E: PRn  N: Cardiac  A: PIV     DVT ppx: heparin  GI ppx: not indicated    Code Status: DNR/DNI (confirmed on admission)  Surrogate Medical Decision-maker: Mike (son) [phone]    PT/ OT advised moderate intensity, she refused SNF.     Dispo:  Optimizing HF rx , and adjusting meds for orthostatic hypotension. Anticipate discharge 1 to 2 days with [facility].      Octavio Coffey MD

## 2024-05-26 LAB
ANION GAP SERPL CALC-SCNC: 12 MMOL/L (ref 10–20)
BUN SERPL-MCNC: 44 MG/DL (ref 6–23)
CALCIUM SERPL-MCNC: 9 MG/DL (ref 8.6–10.6)
CHLORIDE SERPL-SCNC: 102 MMOL/L (ref 98–107)
CO2 SERPL-SCNC: 30 MMOL/L (ref 21–32)
CREAT SERPL-MCNC: 1.76 MG/DL (ref 0.5–1.05)
EGFRCR SERPLBLD CKD-EPI 2021: 28 ML/MIN/1.73M*2
GLUCOSE BLD MANUAL STRIP-MCNC: 101 MG/DL (ref 74–99)
GLUCOSE BLD MANUAL STRIP-MCNC: 108 MG/DL (ref 74–99)
GLUCOSE BLD MANUAL STRIP-MCNC: 122 MG/DL (ref 74–99)
GLUCOSE SERPL-MCNC: 109 MG/DL (ref 74–99)
POTASSIUM SERPL-SCNC: 4.3 MMOL/L (ref 3.5–5.3)
SARS-COV-2 RNA RESP QL NAA+PROBE: NOT DETECTED
SODIUM SERPL-SCNC: 140 MMOL/L (ref 136–145)

## 2024-05-26 PROCEDURE — 80048 BASIC METABOLIC PNL TOTAL CA: CPT | Performed by: INTERNAL MEDICINE

## 2024-05-26 PROCEDURE — 1200000002 HC GENERAL ROOM WITH TELEMETRY DAILY

## 2024-05-26 PROCEDURE — 2500000004 HC RX 250 GENERAL PHARMACY W/ HCPCS (ALT 636 FOR OP/ED)

## 2024-05-26 PROCEDURE — 36415 COLL VENOUS BLD VENIPUNCTURE: CPT | Performed by: INTERNAL MEDICINE

## 2024-05-26 PROCEDURE — 2500000004 HC RX 250 GENERAL PHARMACY W/ HCPCS (ALT 636 FOR OP/ED): Performed by: INTERNAL MEDICINE

## 2024-05-26 PROCEDURE — 82947 ASSAY GLUCOSE BLOOD QUANT: CPT | Mod: 91

## 2024-05-26 PROCEDURE — 87635 SARS-COV-2 COVID-19 AMP PRB: CPT | Performed by: INTERNAL MEDICINE

## 2024-05-26 PROCEDURE — 2500000001 HC RX 250 WO HCPCS SELF ADMINISTERED DRUGS (ALT 637 FOR MEDICARE OP): Performed by: INTERNAL MEDICINE

## 2024-05-26 PROCEDURE — 99232 SBSQ HOSP IP/OBS MODERATE 35: CPT | Performed by: INTERNAL MEDICINE

## 2024-05-26 PROCEDURE — 2500000001 HC RX 250 WO HCPCS SELF ADMINISTERED DRUGS (ALT 637 FOR MEDICARE OP)

## 2024-05-26 RX ADMIN — LEVOTHYROXINE SODIUM 150 MCG: 0.07 TABLET ORAL at 06:30

## 2024-05-26 RX ADMIN — ACETAMINOPHEN 975 MG: 325 TABLET ORAL at 17:18

## 2024-05-26 RX ADMIN — ACETAMINOPHEN 975 MG: 325 TABLET ORAL at 02:09

## 2024-05-26 RX ADMIN — HEPARIN SODIUM 5000 UNITS: 5000 INJECTION INTRAVENOUS; SUBCUTANEOUS at 04:24

## 2024-05-26 RX ADMIN — ATORVASTATIN CALCIUM 20 MG: 20 TABLET, FILM COATED ORAL at 09:59

## 2024-05-26 RX ADMIN — ACETAMINOPHEN 975 MG: 325 TABLET ORAL at 09:59

## 2024-05-26 RX ADMIN — CARVEDILOL 12.5 MG: 12.5 TABLET, FILM COATED ORAL at 17:18

## 2024-05-26 RX ADMIN — CARVEDILOL 12.5 MG: 12.5 TABLET, FILM COATED ORAL at 09:59

## 2024-05-26 RX ADMIN — CLOPIDOGREL BISULFATE 75 MG: 75 TABLET ORAL at 09:59

## 2024-05-26 RX ADMIN — HEPARIN SODIUM 5000 UNITS: 5000 INJECTION INTRAVENOUS; SUBCUTANEOUS at 17:18

## 2024-05-26 RX ADMIN — BISACODYL 10 MG: 10 SUPPOSITORY RECTAL at 10:02

## 2024-05-26 RX ADMIN — SODIUM CHLORIDE, POTASSIUM CHLORIDE, SODIUM LACTATE AND CALCIUM CHLORIDE 250 ML: 600; 310; 30; 20 INJECTION, SOLUTION INTRAVENOUS at 17:19

## 2024-05-26 RX ADMIN — POLYETHYLENE GLYCOL 3350 17 G: 17 POWDER, FOR SOLUTION ORAL at 09:59

## 2024-05-26 RX ADMIN — LISINOPRIL 20 MG: 10 TABLET ORAL at 09:59

## 2024-05-26 ASSESSMENT — COGNITIVE AND FUNCTIONAL STATUS - GENERAL
STANDING UP FROM CHAIR USING ARMS: A LITTLE
WALKING IN HOSPITAL ROOM: A LITTLE
CLIMB 3 TO 5 STEPS WITH RAILING: A LOT
TURNING FROM BACK TO SIDE WHILE IN FLAT BAD: A LITTLE
MOVING TO AND FROM BED TO CHAIR: A LITTLE
DRESSING REGULAR UPPER BODY CLOTHING: A LITTLE
DAILY ACTIVITIY SCORE: 20
MOVING FROM LYING ON BACK TO SITTING ON SIDE OF FLAT BED WITH BEDRAILS: A LITTLE
HELP NEEDED FOR BATHING: A LITTLE
DRESSING REGULAR LOWER BODY CLOTHING: A LITTLE
TOILETING: A LITTLE
MOBILITY SCORE: 17

## 2024-05-26 ASSESSMENT — PAIN SCALES - GENERAL
PAINLEVEL_OUTOF10: 1
PAINLEVEL_OUTOF10: 2

## 2024-05-26 NOTE — PROGRESS NOTES
Kristy Sanchez is a 83 y.o. female on day 5 of admission presenting with Fall, initial encounter.    Transitional Care Coordination Progress Note:  Patient discussed during interdisciplinary rounds.   Team members present: MD and TCC  Plan per Medical/Surgical team: Patient being treated for positive Orthostatics.  Payor: [employer]  Discharge disposition: Home with Home care. Patient has PCP Julian Larson which is [facility] doctor. Patient refuses SNF, patient will need external home care since not  PCP. Patient would like PT/OT and an AID. Patient is active with [facility] now, but will need to treat as new referral since was Inpatient in hospital.  Potential Barriers: None  ADOD: 05/27/24    MALCOLM ESPINO RN

## 2024-05-26 NOTE — PROGRESS NOTES
"Kristy Sanchez is a 83 y.o. female on day 5 of admission presenting with Fall, initial encounter.    Subjective     No events overnight.   Reports no further nausea,   Reports dizziness last night.      RN reported no new events,        Objective     Physical Exam  Constitutional:       Comments: Elderly lady, alert and oriented x 3  Comfortable at rest    HENT:      Head: Normocephalic.      Nose: Nose normal.   Eyes:      Extraocular Movements: Extraocular movements intact.      Pupils: Pupils are equal, round, and reactive to light.   Cardiovascular:      Rate and Rhythm: Normal rate and regular rhythm.      Heart sounds: Normal heart sounds. No murmur heard.  Pulmonary:      Effort: Pulmonary effort is normal. No respiratory distress.      Breath sounds: Normal breath sounds. No wheezing.   Abdominal:      General: There is no distension.      Palpations: Abdomen is soft.      Tenderness: There is no abdominal tenderness.   Musculoskeletal:         General: Normal range of motion.      Cervical back: Normal range of motion.   Skin:     General: Skin is warm.   Neurological:      General: No focal deficit present.      Mental Status: She is oriented to person, place, and time. Mental status is at baseline.   Psychiatric:         Mood and Affect: Mood normal.         Last Recorded Vitals  Blood pressure 123/76, pulse 79, temperature 36.2 °C (97.2 °F), resp. rate 18, height 1.676 m (5' 6\"), weight 81.6 kg (180 lb), SpO2 98%.  Intake/Output last 3 Shifts:  I/O last 3 completed shifts:  In: 880 (10.8 mL/kg) [P.O.:880]  Out: 200 (2.4 mL/kg) [Urine:200 (0.1 mL/kg/hr)]  Weight: 81.6 kg     Relevant Results  Scheduled medications  acetaminophen, 975 mg, oral, q8h  allopurinol, 50 mg, oral, q48h  atorvastatin, 20 mg, oral, Daily  bisacodyl, 10 mg, rectal, Daily  carvedilol, 12.5 mg, oral, BID  clopidogrel, 75 mg, oral, Daily  [Held by provider] empagliflozin, 10 mg, oral, Daily  [Held by provider] furosemide, 40 mg, oral, " Daily  heparin (porcine), 5,000 Units, subcutaneous, q8h  [Held by provider] hydrALAZINE, 50 mg, oral, BID  insulin lispro, 0-5 Units, subcutaneous, TID  lactated Ringer's, 250 mL, intravenous, Once  levothyroxine, 150 mcg, oral, Daily  lisinopril, 20 mg, oral, Daily  polyethylene glycol, 17 g, oral, Daily  [Held by provider] spironolactone, 12.5 mg, oral, Daily      Continuous medications     PRN medications  PRN medications: dextrose, dextrose, glucagon, glucagon, ondansetron    Results for orders placed or performed during the hospital encounter of 05/21/24 (from the past 24 hour(s))   POCT GLUCOSE   Result Value Ref Range    POCT Glucose 262 (H) 74 - 99 mg/dL   Basic metabolic panel   Result Value Ref Range    Glucose 109 (H) 74 - 99 mg/dL    Sodium 140 136 - 145 mmol/L    Potassium 4.3 3.5 - 5.3 mmol/L    Chloride 102 98 - 107 mmol/L    Bicarbonate 30 21 - 32 mmol/L    Anion Gap 12 10 - 20 mmol/L    Urea Nitrogen 44 (H) 6 - 23 mg/dL    Creatinine 1.76 (H) 0.50 - 1.05 mg/dL    eGFR 28 (L) >60 mL/min/1.73m*2    Calcium 9.0 8.6 - 10.6 mg/dL   POCT GLUCOSE   Result Value Ref Range    POCT Glucose 101 (H) 74 - 99 mg/dL   Sars-CoV-2 PCR   Result Value Ref Range    Coronavirus 2019, PCR Not Detected Not Detected   POCT GLUCOSE   Result Value Ref Range    POCT Glucose 122 (H) 74 - 99 mg/dL         Assessment/Plan   Principal Problem:    Fall, initial encounter  Active Problems:    Syncope    Kristy Sanchez is an 83 year old lady with PMH significant for HFrEF (45%), JENELLE (on CPAP), CKD 4, HTN, HLD, T2DM, Hypothyroidism, gout, Hx CVA (2017 on clopidogrel), Hx breast cancer s/p surgery and chemo (2004, reportedly in remission) who is presented to [facility] ED on 5/21 for Dizziness and fall. She is positive for orthostatic drop in blood pressure.     #Dizziness, Fall:  -History most c/w orthostasis, (potentially iso uptitration of GDMT and overdiuresis)  -Imaging in ED including CT head/C spine without fracture  -s/p 1L LR in  ED   -Holding diuretics,   Orthostatics still positive on 5/26 am,   Entresto is discontinued,   Carvedilol dose reduced to 12.5 mg BID,   Monitor,     #HTN  #HFrEF  -EF 45% at OSH (5/15)  Change carvedilol to 12.5 mg BID,     Entresto discontinued due to postural hypotension even with lower dose.    Holding jardiance 10,   Hold hydralazine 50 BID, jr 12.5, Lasix 40,   Continue atorvastatin 20  On  Lisinopril 20 mg daily  Continue to monitor orthostatics.   CXR on 5/25 showed no cardiomegaly, no pulmonary edema, showed mild congestion,   Encourage oral intake, and monitor orthostatics.        #SHAHAB on CKD IV  -Recent SCr 1.71 (5/16 per care everywhere at [facility]),elevated  to 2.32 in ED  -Recently had Lasix increased to 40 daily and was started on entresto 49-51 BID at [facility],   reportedly followed instructions for Lisinopril washout, was also started on spironolactone on discharge.  Monitor   -Holding spironolactone and empagliflozin iso SHAHAB  Creatinine is 1.7 mg today.     Left ankle pain:  X-ray showed no fractures,         #Hypothyroidism  -On levothyroxine 150 mcg daily, [facility] 14.3 [facility])    #Hx CVA  -On clopidogrel 75 daily     #Gout  -Continue home allopurinol 50 every other day     #T2DM  - last HBA1C 6.7 (10/2023)  -On empagliflozin for cotreatment with HFrEF  -holding empagliflozin  -SSI while inpatient     F: PRN  E: PRn  N: Cardiac  A: PIV     DVT ppx: heparin  GI ppx: not indicated    Code Status: DNR/DNI (confirmed on admission)  Surrogate Medical Decision-maker: Mike (son) [phone]    PT/ OT advised moderate intensity, she refused SNF.     Dispo: Optimizing HF rx , and adjusting meds for orthostatic hypotension. Anticipate discharge when there is no more orthostatic drop in blood pressure. ( ADOD on 1 to 2 days with [facility]).      Octavio Coffey MD

## 2024-05-26 NOTE — CARE PLAN
The patient's goals for the shift include  less shoulder pain    The clinical goals for the shift include pt will remain free from falls for duration of shift.     Over the shift, the patient made progress towards all goals.

## 2024-05-26 NOTE — CARE PLAN
The patient's goals for the shift include rest    The clinical goals for the shift include rest    Problem: Pain  Goal: My pain/discomfort is manageable  Outcome: Progressing     Problem: Safety  Goal: Patient will be injury free during hospitalization  Outcome: Progressing  Goal: I will remain free of falls  Outcome: Progressing     Problem: Daily Care  Goal: Daily care needs are met  Outcome: Progressing     Problem: Psychosocial Needs  Goal: Demonstrates ability to cope with hospitalization/illness  Outcome: Progressing  Goal: Collaborate with me, my family, and caregiver to identify my specific goals  Outcome: Progressing     Problem: Discharge Barriers  Goal: My discharge needs are met  Outcome: Progressing

## 2024-05-27 LAB
ALBUMIN SERPL BCP-MCNC: 3.4 G/DL (ref 3.4–5)
ALP SERPL-CCNC: 56 U/L (ref 33–136)
ALT SERPL W P-5'-P-CCNC: 22 U/L (ref 7–45)
ANION GAP SERPL CALC-SCNC: 13 MMOL/L (ref 10–20)
AST SERPL W P-5'-P-CCNC: 29 U/L (ref 9–39)
BILIRUB SERPL-MCNC: 0.4 MG/DL (ref 0–1.2)
BUN SERPL-MCNC: 42 MG/DL (ref 6–23)
CALCIUM SERPL-MCNC: 9.3 MG/DL (ref 8.6–10.6)
CHLORIDE SERPL-SCNC: 103 MMOL/L (ref 98–107)
CO2 SERPL-SCNC: 26 MMOL/L (ref 21–32)
CREAT SERPL-MCNC: 1.83 MG/DL (ref 0.5–1.05)
EGFRCR SERPLBLD CKD-EPI 2021: 27 ML/MIN/1.73M*2
ERYTHROCYTE [DISTWIDTH] IN BLOOD BY AUTOMATED COUNT: 15.1 % (ref 11.5–14.5)
GLUCOSE BLD MANUAL STRIP-MCNC: 162 MG/DL (ref 74–99)
GLUCOSE BLD MANUAL STRIP-MCNC: 173 MG/DL (ref 74–99)
GLUCOSE BLD MANUAL STRIP-MCNC: 178 MG/DL (ref 74–99)
GLUCOSE BLD MANUAL STRIP-MCNC: 87 MG/DL (ref 74–99)
GLUCOSE SERPL-MCNC: 85 MG/DL (ref 74–99)
HCT VFR BLD AUTO: 38.6 % (ref 36–46)
HGB BLD-MCNC: 12.4 G/DL (ref 12–16)
MCH RBC QN AUTO: 31.6 PG (ref 26–34)
MCHC RBC AUTO-ENTMCNC: 32.1 G/DL (ref 32–36)
MCV RBC AUTO: 98 FL (ref 80–100)
NRBC BLD-RTO: 0 /100 WBCS (ref 0–0)
PLATELET # BLD AUTO: 187 X10*3/UL (ref 150–450)
POTASSIUM SERPL-SCNC: 4.4 MMOL/L (ref 3.5–5.3)
PROT SERPL-MCNC: 6.7 G/DL (ref 6.4–8.2)
RBC # BLD AUTO: 3.93 X10*6/UL (ref 4–5.2)
SODIUM SERPL-SCNC: 138 MMOL/L (ref 136–145)
WBC # BLD AUTO: 6.6 X10*3/UL (ref 4.4–11.3)

## 2024-05-27 PROCEDURE — 85027 COMPLETE CBC AUTOMATED: CPT | Performed by: INTERNAL MEDICINE

## 2024-05-27 PROCEDURE — 80053 COMPREHEN METABOLIC PANEL: CPT | Performed by: INTERNAL MEDICINE

## 2024-05-27 PROCEDURE — 99233 SBSQ HOSP IP/OBS HIGH 50: CPT | Performed by: STUDENT IN AN ORGANIZED HEALTH CARE EDUCATION/TRAINING PROGRAM

## 2024-05-27 PROCEDURE — 82947 ASSAY GLUCOSE BLOOD QUANT: CPT | Mod: 91

## 2024-05-27 PROCEDURE — 2500000004 HC RX 250 GENERAL PHARMACY W/ HCPCS (ALT 636 FOR OP/ED)

## 2024-05-27 PROCEDURE — 36415 COLL VENOUS BLD VENIPUNCTURE: CPT | Performed by: INTERNAL MEDICINE

## 2024-05-27 PROCEDURE — 1200000002 HC GENERAL ROOM WITH TELEMETRY DAILY

## 2024-05-27 PROCEDURE — 2500000001 HC RX 250 WO HCPCS SELF ADMINISTERED DRUGS (ALT 637 FOR MEDICARE OP): Performed by: INTERNAL MEDICINE

## 2024-05-27 PROCEDURE — 82947 ASSAY GLUCOSE BLOOD QUANT: CPT

## 2024-05-27 PROCEDURE — 2500000002 HC RX 250 W HCPCS SELF ADMINISTERED DRUGS (ALT 637 FOR MEDICARE OP, ALT 636 FOR OP/ED)

## 2024-05-27 PROCEDURE — 2500000001 HC RX 250 WO HCPCS SELF ADMINISTERED DRUGS (ALT 637 FOR MEDICARE OP)

## 2024-05-27 RX ADMIN — CARVEDILOL 12.5 MG: 12.5 TABLET, FILM COATED ORAL at 09:26

## 2024-05-27 RX ADMIN — HEPARIN SODIUM 5000 UNITS: 5000 INJECTION INTRAVENOUS; SUBCUTANEOUS at 17:43

## 2024-05-27 RX ADMIN — ATORVASTATIN CALCIUM 20 MG: 20 TABLET, FILM COATED ORAL at 09:26

## 2024-05-27 RX ADMIN — CARVEDILOL 12.5 MG: 12.5 TABLET, FILM COATED ORAL at 17:43

## 2024-05-27 RX ADMIN — HEPARIN SODIUM 5000 UNITS: 5000 INJECTION INTRAVENOUS; SUBCUTANEOUS at 01:02

## 2024-05-27 RX ADMIN — ACETAMINOPHEN 975 MG: 325 TABLET ORAL at 01:03

## 2024-05-27 RX ADMIN — LISINOPRIL 20 MG: 10 TABLET ORAL at 09:26

## 2024-05-27 RX ADMIN — LEVOTHYROXINE SODIUM 150 MCG: 0.07 TABLET ORAL at 06:02

## 2024-05-27 RX ADMIN — POLYETHYLENE GLYCOL 3350 17 G: 17 POWDER, FOR SOLUTION ORAL at 09:28

## 2024-05-27 RX ADMIN — HEPARIN SODIUM 5000 UNITS: 5000 INJECTION INTRAVENOUS; SUBCUTANEOUS at 09:33

## 2024-05-27 RX ADMIN — ACETAMINOPHEN 975 MG: 325 TABLET ORAL at 09:26

## 2024-05-27 RX ADMIN — BISACODYL 10 MG: 10 SUPPOSITORY RECTAL at 09:33

## 2024-05-27 RX ADMIN — ALLOPURINOL 50 MG: 100 TABLET ORAL at 09:26

## 2024-05-27 RX ADMIN — INSULIN LISPRO 1 UNITS: 100 INJECTION, SOLUTION INTRAVENOUS; SUBCUTANEOUS at 17:34

## 2024-05-27 RX ADMIN — CLOPIDOGREL BISULFATE 75 MG: 75 TABLET ORAL at 09:26

## 2024-05-27 RX ADMIN — ACETAMINOPHEN 975 MG: 325 TABLET ORAL at 17:43

## 2024-05-27 RX ADMIN — INSULIN LISPRO 1 UNITS: 100 INJECTION, SOLUTION INTRAVENOUS; SUBCUTANEOUS at 13:42

## 2024-05-27 ASSESSMENT — COGNITIVE AND FUNCTIONAL STATUS - GENERAL
TOILETING: A LITTLE
WALKING IN HOSPITAL ROOM: A LITTLE
DAILY ACTIVITIY SCORE: 20
TURNING FROM BACK TO SIDE WHILE IN FLAT BAD: A LITTLE
DRESSING REGULAR UPPER BODY CLOTHING: A LITTLE
MOVING FROM LYING ON BACK TO SITTING ON SIDE OF FLAT BED WITH BEDRAILS: A LITTLE
CLIMB 3 TO 5 STEPS WITH RAILING: A LOT
DRESSING REGULAR LOWER BODY CLOTHING: A LITTLE
HELP NEEDED FOR BATHING: A LITTLE
MOVING TO AND FROM BED TO CHAIR: A LITTLE
STANDING UP FROM CHAIR USING ARMS: A LITTLE
MOBILITY SCORE: 17

## 2024-05-27 ASSESSMENT — PAIN SCALES - GENERAL
PAINLEVEL_OUTOF10: 7
PAINLEVEL_OUTOF10: 0 - NO PAIN

## 2024-05-27 ASSESSMENT — PAIN - FUNCTIONAL ASSESSMENT: PAIN_FUNCTIONAL_ASSESSMENT: 0-10

## 2024-05-27 NOTE — PROGRESS NOTES
Kristy Sanchez is a 83 y.o. female on day 6 of admission presenting with Fall, initial encounter.    Subjective   Chart reviewed; noted that patient is recommended for moderate intensity at discharge but currently refusing. Patient will discharge with [facility] services; [facility] requested that  follow up with patient regarding Passport. Discussed with Dr. Jaimes who reports that patient will not be medically cleared today. Message sent to CHW's to follow up with patient regarding Passport request.    - Betty SHERIDAN, MA, LSW  Care Transitions   [facility] Secure Chat or [phone]

## 2024-05-27 NOTE — PROGRESS NOTES
"Kristy Sanchez is a 83 y.o. female on hospital day 6 of admission presenting with Fall, initial encounter.    Subjective     The patient was seen and examined at bedside. Denies any lightheadedness, chest pain, or SOB.    Objective     GENERAL APPEARANCE: A&Ox3, appears in no acute distress  HEAD: normocephalic, atraumatic  THROAT: Oral cavity and pharynx normal. No inflammation, swelling, exudate, or lesions.  NECK: Neck supple, non-tender without lymphadenopathy, masses or thyromegaly.  CARDIAC: Normal S1 and S2. No S3, S4 or murmurs. Rhythm is regular. There is no peripheral edema, cyanosis or pallor. Extremities are warm and well perfused. No carotid bruits.  LUNGS: Clear to auscultation bilaterally without rales, rhonchi, wheezing or diminished breath sounds.  ABDOMEN: Positive bowel sounds. Soft, nondistended, nontender. No guarding or rebound. No masses.  EXTREMITIES: No significant deformity or joint abnormality. No edema. Peripheral pulses intact. No varicosities.  SKIN: Skin normal color, texture and turgor with no lesions or rash  PSYCHIATRIC: oriented to person, place, and time, good judgement and reason, without hallucinations, abnormal affect or abnormal behaviors during the examination. Patient is not suicidal.        Last Recorded Vitals  Blood pressure 154/64, pulse 71, temperature 36.3 °C (97.3 °F), resp. rate 18, height 1.676 m (5' 6\"), weight 81.6 kg (180 lb), SpO2 96%.  Intake/Output last 3 Shifts:  I/O last 3 completed shifts:  In: - (0 mL/kg)   Out: 1150 (14.1 mL/kg) [Urine:1150 (0.4 mL/kg/hr)]  Weight: 81.6 kg     Relevant Results  Lab Results   Component Value Date    WBC 6.6 05/27/2024    HGB 12.4 05/27/2024    HCT 38.6 05/27/2024    MCV 98 05/27/2024     05/27/2024      Lab Results   Component Value Date    GLUCOSE 85 05/27/2024    CALCIUM 9.3 05/27/2024     05/27/2024    K 4.4 05/27/2024    CO2 26 05/27/2024     05/27/2024    BUN 42 (H) 05/27/2024    CREATININE 1.83 (H) " 05/27/2024     Scheduled medications  acetaminophen, 975 mg, oral, q8h  allopurinol, 50 mg, oral, q48h  atorvastatin, 20 mg, oral, Daily  bisacodyl, 10 mg, rectal, Daily  carvedilol, 12.5 mg, oral, BID  clopidogrel, 75 mg, oral, Daily  [Held by provider] empagliflozin, 10 mg, oral, Daily  [Held by provider] furosemide, 40 mg, oral, Daily  heparin (porcine), 5,000 Units, subcutaneous, q8h  [Held by provider] hydrALAZINE, 50 mg, oral, BID  insulin lispro, 0-5 Units, subcutaneous, TID  levothyroxine, 150 mcg, oral, Daily  lisinopril, 20 mg, oral, Daily  polyethylene glycol, 17 g, oral, Daily  [Held by provider] spironolactone, 12.5 mg, oral, Daily      Continuous medications     PRN medications  PRN medications: dextrose, dextrose, glucagon, glucagon, ondansetron    Assessment/Plan     Principal Problem:    Fall, initial encounter  Active Problems:    Syncope     Kristy Sanchez is an 83 year old lady with PMH significant for HFrEF (45%), JENELLE (on CPAP), CKD 4, HTN, HLD, T2DM, Hypothyroidism, gout, Hx CVA (2017 on clopidogrel), Hx breast cancer s/p surgery and chemo (2004, reportedly in remission) who is presented to [facility] ED on 5/21 for Dizziness and fall. She is positive for orthostatic drop in blood pressure.     Dizziness, Fall:  -History most c/w orthostasis, (potentially iso uptitration of GDMT and overdiuresis)  -Imaging in ED including CT head/C spine without fracture  -s/p 1L LR in ED   -Holding diuretics,   - Orthostatics still positive on 5/26 am, repeat today  - Entresto is discontinued,   - Carvedilol dose reduced to 12.5 mg BID,   - The patient denies any lightheadedness when moving around today, will continue to monitor BP overnight and if remains stable possible discharge with homecare tomorrow,      HTN  HFrEF  - EF 45% at OSH (5/15)  - Change carvedilol to 12.5 mg BID,   - Entresto discontinued due to postural hypotension even with lower dose.    - Holding jardiance 10,   - Hold hydralazine 50 BID, jr  12.5, Lasix 40,   - Continue atorvastatin 20  - On  Lisinopril 20 mg daily  - Continue to monitor orthostatics.   - CXR on 5/25 showed no cardiomegaly, no pulmonary edema, showed mild congestion,   - Encourage oral intake, and monitor orthostatics.      SHAHAB on CKD IV, improved  - Recent SCr 1.71 (5/16 per care everywhere at [facility]),elevated  to 2.32 in ED  -Recently had Lasix increased to 40 daily and was started on entresto 49-51 BID at [facility],   - reportedly followed instructions for Lisinopril washout, was also started on spironolactone on discharge.  - Holding spironolactone and empagliflozin iso SHAHAB  - creatinine stable at this time     Left ankle pain:  - X-ray showed no fractures,      Hypothyroidism  - On levothyroxine 150 mcg daily, last TSH 14.3 (4/6 at [facility])     Hx CVA  - On clopidogrel 75 daily     Gout  - Continue home allopurinol 50 every other day     T2DM  - last HBA1C 6.7 (10/2023)  - On empagliflozin for cotreatment with HFrEF  - holding empagliflozin  - SSI while inpatient        DVT ppx: heparin  GI ppx: not indicated  Code Status: DNR/DNI (confirmed on admission)  Surrogate Medical Decision-maker: Mike (son) [phone]   Dispo: PT/ OT advised moderate intensity, she refused SNF.  Optimizing HF rx , and adjusting meds for orthostatic hypotension. Possible discharge home tomorrow with home care if BP remains stable without orthostasis      Rad Jaimes MD     The patient encounter includes all but not limited to; Evaluation of laboratory results, pertinent imaging, and vital signs. Daily updates are discussed with any consulting services and family/medical power of  as needed. The patient's discharge  process begins at admission and daily contact with the patient's TCC and SW is pertinent in their efficient and safe discharge.

## 2024-05-28 ENCOUNTER — PHARMACY VISIT (OUTPATIENT)
Dept: PHARMACY | Facility: CLINIC | Age: 84
End: 2024-05-28
Payer: COMMERCIAL

## 2024-05-28 VITALS
HEIGHT: 66 IN | TEMPERATURE: 97.5 F | WEIGHT: 180 LBS | HEART RATE: 74 BPM | SYSTOLIC BLOOD PRESSURE: 136 MMHG | RESPIRATION RATE: 18 BRPM | BODY MASS INDEX: 28.93 KG/M2 | OXYGEN SATURATION: 98 % | DIASTOLIC BLOOD PRESSURE: 82 MMHG

## 2024-05-28 LAB
ALBUMIN SERPL BCP-MCNC: 3.2 G/DL (ref 3.4–5)
ANION GAP SERPL CALC-SCNC: 14 MMOL/L (ref 10–20)
BUN SERPL-MCNC: 42 MG/DL (ref 6–23)
CALCIUM SERPL-MCNC: 8.5 MG/DL (ref 8.6–10.6)
CHLORIDE SERPL-SCNC: 106 MMOL/L (ref 98–107)
CO2 SERPL-SCNC: 24 MMOL/L (ref 21–32)
CREAT SERPL-MCNC: 1.92 MG/DL (ref 0.5–1.05)
EGFRCR SERPLBLD CKD-EPI 2021: 25 ML/MIN/1.73M*2
GLUCOSE BLD MANUAL STRIP-MCNC: 141 MG/DL (ref 74–99)
GLUCOSE BLD MANUAL STRIP-MCNC: 86 MG/DL (ref 74–99)
GLUCOSE SERPL-MCNC: 88 MG/DL (ref 74–99)
PHOSPHATE SERPL-MCNC: 3.6 MG/DL (ref 2.5–4.9)
POTASSIUM SERPL-SCNC: 4.5 MMOL/L (ref 3.5–5.3)
SODIUM SERPL-SCNC: 139 MMOL/L (ref 136–145)

## 2024-05-28 PROCEDURE — 80069 RENAL FUNCTION PANEL: CPT | Performed by: STUDENT IN AN ORGANIZED HEALTH CARE EDUCATION/TRAINING PROGRAM

## 2024-05-28 PROCEDURE — 2500000001 HC RX 250 WO HCPCS SELF ADMINISTERED DRUGS (ALT 637 FOR MEDICARE OP)

## 2024-05-28 PROCEDURE — 2500000001 HC RX 250 WO HCPCS SELF ADMINISTERED DRUGS (ALT 637 FOR MEDICARE OP): Performed by: INTERNAL MEDICINE

## 2024-05-28 PROCEDURE — 2500000004 HC RX 250 GENERAL PHARMACY W/ HCPCS (ALT 636 FOR OP/ED)

## 2024-05-28 PROCEDURE — 82947 ASSAY GLUCOSE BLOOD QUANT: CPT

## 2024-05-28 PROCEDURE — 36415 COLL VENOUS BLD VENIPUNCTURE: CPT | Performed by: STUDENT IN AN ORGANIZED HEALTH CARE EDUCATION/TRAINING PROGRAM

## 2024-05-28 PROCEDURE — RXMED WILLOW AMBULATORY MEDICATION CHARGE

## 2024-05-28 PROCEDURE — 99239 HOSP IP/OBS DSCHRG MGMT >30: CPT | Performed by: STUDENT IN AN ORGANIZED HEALTH CARE EDUCATION/TRAINING PROGRAM

## 2024-05-28 RX ORDER — FUROSEMIDE 40 MG/1
40 TABLET ORAL DAILY PRN
Qty: 30 TABLET | Refills: 0 | Status: SHIPPED | OUTPATIENT
Start: 2024-05-28

## 2024-05-28 RX ORDER — AMOXICILLIN 250 MG
1 CAPSULE ORAL DAILY
Qty: 30 TABLET | Refills: 0 | Status: SHIPPED | OUTPATIENT
Start: 2024-05-28 | End: 2024-06-27

## 2024-05-28 RX ORDER — LISINOPRIL 20 MG/1
20 TABLET ORAL DAILY
Qty: 30 TABLET | Refills: 1 | Status: SHIPPED | OUTPATIENT
Start: 2024-05-29 | End: 2024-07-28

## 2024-05-28 RX ORDER — CARVEDILOL 12.5 MG/1
12.5 TABLET ORAL
Qty: 60 TABLET | Refills: 1 | Status: SHIPPED | OUTPATIENT
Start: 2024-05-28 | End: 2024-07-27

## 2024-05-28 RX ADMIN — LISINOPRIL 20 MG: 10 TABLET ORAL at 10:17

## 2024-05-28 RX ADMIN — ACETAMINOPHEN 975 MG: 325 TABLET ORAL at 01:02

## 2024-05-28 RX ADMIN — HEPARIN SODIUM 5000 UNITS: 5000 INJECTION INTRAVENOUS; SUBCUTANEOUS at 00:53

## 2024-05-28 RX ADMIN — CLOPIDOGREL BISULFATE 75 MG: 75 TABLET ORAL at 10:17

## 2024-05-28 RX ADMIN — ATORVASTATIN CALCIUM 20 MG: 20 TABLET, FILM COATED ORAL at 10:17

## 2024-05-28 RX ADMIN — LEVOTHYROXINE SODIUM 150 MCG: 0.07 TABLET ORAL at 07:01

## 2024-05-28 RX ADMIN — CARVEDILOL 12.5 MG: 12.5 TABLET, FILM COATED ORAL at 10:17

## 2024-05-28 RX ADMIN — ACETAMINOPHEN 975 MG: 325 TABLET ORAL at 10:00

## 2024-05-28 RX ADMIN — HEPARIN SODIUM 5000 UNITS: 5000 INJECTION INTRAVENOUS; SUBCUTANEOUS at 10:17

## 2024-05-28 ASSESSMENT — PAIN - FUNCTIONAL ASSESSMENT
PAIN_FUNCTIONAL_ASSESSMENT: 0-10
PAIN_FUNCTIONAL_ASSESSMENT: 0-10

## 2024-05-28 ASSESSMENT — COGNITIVE AND FUNCTIONAL STATUS - GENERAL
DAILY ACTIVITIY SCORE: 24
MOBILITY SCORE: 22
CLIMB 3 TO 5 STEPS WITH RAILING: A LOT

## 2024-05-28 ASSESSMENT — PAIN SCALES - GENERAL
PAINLEVEL_OUTOF10: 0 - NO PAIN
PAINLEVEL_OUTOF10: 0 - NO PAIN

## 2024-05-28 NOTE — PROGRESS NOTES
Kristy Sanchez is a 83 y.o. female on day 7 of admission presenting with Fall, initial encounter.    Transitional Care Coordination Progress Note:  Patient discussed during interdisciplinary rounds.   Team members present: MD and TCC  Plan per Medical/Surgical team: Patient Medically Ready.  Payor: [employer]  Discharge disposition: Home with [facility] for PT/OT and aid.  Potential Barriers: None  ADOD: 05/28/24      MALCOLM ESPINO RN

## 2024-05-28 NOTE — NURSING NOTE
Pt A&Ox4 at time of discharge. Reviewed discharge instructions, medication prescriptions and changes, and follow up appointments with pt. Bedside RN to remove IV, telemetry removed. Pt verbalized understanding of discharge instructions, no further questions. Prescriptions to be delivered to pt bedside by meds to beds. Pt to be transported home via son. Pt resting comfortably at time of discharge.    7

## 2024-05-31 NOTE — CARE PLAN
Patient inquired about Passport services. Spoke to patient and she was scheduled for an assessment but then admitted to hospital. Will follow up to see if she has rescheduled assessment.          Discussed the following topics on behalf of the patient:  []  Behavioral Health Assistance     []  Case Management  []   Assistance  []  Digital Equity Assistance  []  Dental Health Assistance  []  Education Assistance  []  Employment Assistance  []  Financial Strain Relief Assistance  []  Food Insecurity Assistance  []  Healthcare Coverage Assistance  []  Housing Stability Assistance  []  IP Violence Relief Assistance  []  Legal Assistance  []  Physical Activity Assistance  []  Social Connection Assistance  []  Stress Relief Assistance   []  Substance Abuse Assistance  []  Transportation Assistance  []  Utility Assistance  [x]  Other: [Passport services]    Next Steps:         Elise Long LCSW

## 2024-07-08 NOTE — DISCHARGE SUMMARY
Discharge Diagnosis  Fall, initial encounter    Issues Requiring Follow-Up  N/A    Test Results Pending At Discharge  N/A      Hospital Course    Kristy Sanchez is an 83 year old lady with PMH significant for HFrEF (45%), JENELLE (on CPAP), CKD 4, HTN, HLD, T2DM, Hypothyroidism, gout, Hx CVA (2017 on clopidogrel), Hx breast cancer s/p surgery and chemo (2004, reportedly in remission) who is presented to [facility] ED on 5/21 for Dizziness and fall. She is positive for orthostatic drop in blood pressure.     Dizziness, Fall:  -History most c/w orthostasis, (potentially iso uptitration of GDMT and overdiuresis)  -Imaging in ED including CT head/C spine without fracture  -s/p 1L LR in ED   -Holding diuretics,   - Orthostatics still positive on 5/26 am, repeat today  - Entresto is discontinued,   - Carvedilol dose reduced to 12.5 mg BID,   -  discharge with homecare     HTN  HFrEF  - EF 45% at OSH (5/15)  - Change carvedilol to 12.5 mg BID,   - Entresto discontinued due to postural hypotension even with lower dose.    - Holding jardiance 10,   - Hold hydralazine 50 BID, jr 12.5, Lasix 40,   - Continue atorvastatin 20  - On  Lisinopril 20 mg daily  - Continue to monitor orthostatics.   - CXR on 5/25 showed no cardiomegaly, no pulmonary edema, showed mild congestion,   - Encourage oral intake, and monitor orthostatics.      SHAHAB on CKD IV, improved  - Recent SCr 1.71 (5/16 per care everywhere at [facility]),elevated  to 2.32 in ED  -Recently had Lasix increased to 40 daily and was started on entresto 49-51 BID at [facility],   - reportedly followed instructions for Lisinopril washout, was also started on spironolactone on discharge.  - Holding spironolactone and empagliflozin iso SHAHAB  - creatinine stable at this time    The patient improved after fluid resuscitation and [facility].    Pertinent Physical Exam At Time of Discharge    GENERAL APPEARANCE: A&Ox3, appears in no acute distress  HEAD: normocephalic,  atraumatic  THROAT: Oral cavity and pharynx normal. No inflammation, swelling, exudate, or lesions.  NECK: Neck supple, non-tender without lymphadenopathy, masses or thyromegaly.  CARDIAC: Normal S1 and S2. No S3, S4 or murmurs. Rhythm is regular. There is no peripheral edema, cyanosis or pallor. Extremities are warm and well perfused. No carotid bruits.  LUNGS: Clear to auscultation bilaterally without rales, rhonchi, wheezing or diminished breath sounds.  ABDOMEN: Positive bowel sounds. Soft, nondistended, nontender. No guarding or rebound. No masses.  EXTREMITIES: No significant deformity or joint abnormality. No edema. Peripheral pulses intact. No varicosities.  SKIN: Skin normal color, texture and turgor with no lesions or rash  PSYCHIATRIC: oriented to person, place, and time, good judgement and reason, without hallucinations, abnormal affect or abnormal behaviors during the examination. Patient is not suicidal.        Home Medications     Medication List      START taking these medications     lisinopril 20 mg tablet; Take 1 tablet (20 mg) by mouth once daily.     CHANGE how you take these medications     carvedilol 12.5 mg tablet; Commonly known as: Coreg; Take 1 tablet (12.5   mg) by mouth 2 times daily (morning and late afternoon).; What changed:   medication strength, how much to take   furosemide 40 mg tablet; Commonly known as: Lasix; Take 1 tablet (40 mg)   by mouth once daily as needed (lower extremity swelling).; What changed:   when to take this, reasons to take this     CONTINUE taking these medications     acetaminophen 500 mg tablet; Commonly known as: Tylenol   allopurinol 100 mg tablet; Commonly known as: Zyloprim   atorvastatin 20 mg tablet; Commonly known as: Lipitor   clopidogrel 75 mg tablet; Commonly known as: Plavix   Jardiance 10 mg; Generic drug: empagliflozin   levothyroxine 150 mcg tablet; Commonly known as: Synthroid, Levoxyl     STOP taking these medications     Entresto 49-51 mg  tablet; Generic drug: sacubitriL-valsartan   hydrALAZINE 50 mg tablet; Commonly known as: Apresoline   spironolactone 25 mg tablet; Commonly known as: Aldactone     ASK your doctor about these medications     Senexon-S 8.6-50 mg tablet; Generic drug: sennosides-docusate sodium;   Take 1 tablet by mouth once daily.; Ask about: Should I take this   medication?       Outpatient Follow-Up  No future appointments.    Ricco Jamies MD